# Patient Record
Sex: MALE | Race: ASIAN | NOT HISPANIC OR LATINO | Employment: OTHER
[De-identification: names, ages, dates, MRNs, and addresses within clinical notes are randomized per-mention and may not be internally consistent; named-entity substitution may affect disease eponyms.]

---

## 2017-10-17 ENCOUNTER — EXTERNAL RECORD (OUTPATIENT)
Dept: HEALTH INFORMATION MANAGEMENT | Facility: OTHER | Age: 67
End: 2017-10-17

## 2017-11-21 ENCOUNTER — TELEPHONE (OUTPATIENT)
Dept: NEUROLOGY | Facility: CLINIC | Age: 67
End: 2017-11-21

## 2017-11-21 ENCOUNTER — OFFICE VISIT (OUTPATIENT)
Dept: NEUROLOGY | Facility: CLINIC | Age: 67
End: 2017-11-21

## 2017-11-21 VITALS
RESPIRATION RATE: 20 BRPM | HEART RATE: 80 BPM | SYSTOLIC BLOOD PRESSURE: 144 MMHG | DIASTOLIC BLOOD PRESSURE: 80 MMHG | BODY MASS INDEX: 27 KG/M2 | WEIGHT: 161 LBS

## 2017-11-21 DIAGNOSIS — F32.9 REACTIVE DEPRESSION: ICD-10-CM

## 2017-11-21 DIAGNOSIS — R41.3 MEMORY LOSS, SHORT TERM: Primary | ICD-10-CM

## 2017-11-21 DIAGNOSIS — F10.21 ALCOHOL DEPENDENCE IN REMISSION (HCC): ICD-10-CM

## 2017-11-21 DIAGNOSIS — R46.89 AGGRESSIVENESS: ICD-10-CM

## 2017-11-21 DIAGNOSIS — F41.9 ANXIETY: ICD-10-CM

## 2017-11-21 PROCEDURE — 99205 OFFICE O/P NEW HI 60 MIN: CPT | Performed by: OTHER

## 2017-11-21 NOTE — PROGRESS NOTES
[facility] [address]27   Neurology; INITIAL CLINIC VISIT  2017, 9:41 AM     Sue Rankin Patient Status:  No patient class for patient encounter    6/15/1950 MRN [id number]   Location ED [facility], [facility] Hypertension in his brother. SOCIAL HISTORY:   reports that he has never smoked. He has never used smokeless tobacco. He reports that he does not drink alcohol or use drugs.     ALLERGIES:  No Known Allergies    MEDICATIONS:    Current Outpatient Prescri wt loss  ALLERGY/IMM.: denies food or seasonal allergies      PHYSICAL EXAMINATION:  VITAL SIGNS: /80 (BP Location: Right arm, Patient Position: Sitting, Cuff Size: adult)   Pulse 80   Resp 20   Wt 161 lb   BMI 26.79 kg/m²    Body mass index is 26.79 loss, short term - Primary    Relevant Orders    EEG    TONIE, DIRECT, REFLEX TO 9 ENAS    FOLIC ACID SERUM(FOLATE)    C-REACTIVE PROTEIN    COMP METABOLIC PANEL (14)    CBC WITH DIFFERENTIAL WITH PLATELET    SED RATE, WESTERGREN (AUTOMATED)    RHEUMATOID AR Andie  11/21/2017

## 2017-11-21 NOTE — PATIENT INSTRUCTIONS
Refill policies:    • Allow 2-3 business days for refills; controlled substances may take longer.   • Contact your pharmacy at least 5 days prior to running out of medication and have them send an electronic request or submit request through the [facility] have a procedure or additional testing performed.  FOR BE[facility]IORAL [facility]) will contact your insurance carrier to obtain pre-certification or prior authorization.     Unfortunately, DREW has seen an increase in denial of payment even though the p questions.   Location:  [facility]    [address]  (MOB 2) [address] [facility], [address] [place] [address]

## 2017-12-26 ENCOUNTER — TELEPHONE (OUTPATIENT)
Dept: NEUROLOGY | Facility: CLINIC | Age: 67
End: 2017-12-26

## 2017-12-26 NOTE — TELEPHONE ENCOUNTER
Per TE from 11/21/17, lab orders had been faxed to pts PCPs office at their request on that date. No subsequent results have been received. LMTCB on both home and cell numbers.   Upon return call, please see if pt had these labs drawn by PCP, and if so

## 2017-12-29 ENCOUNTER — TELEPHONE (OUTPATIENT)
Dept: NEUROLOGY | Facility: CLINIC | Age: 67
End: 2017-12-29

## 2017-12-29 NOTE — TELEPHONE ENCOUNTER
Pateints daughter, Moo Osorio, contacted the office. States she will have the lab results faxed to our office. Provided office number. Letter placed in shred bin and not sent to patient. Awaiting fax.

## 2018-01-04 NOTE — TELEPHONE ENCOUNTER
Test results from Dr. Tejeda Ours office reviewed and initialed by Dr. Jimi Issa. Results were WNL, except HgA1C, which was 8.8. Pt had previously been informed of results by Dr. Clementina Nunn, so no need to contact pt.  Results were provided for the following with sp

## 2018-02-10 ENCOUNTER — EXTERNAL RECORD (OUTPATIENT)
Dept: HEALTH INFORMATION MANAGEMENT | Facility: OTHER | Age: 68
End: 2018-02-10

## 2018-03-01 ENCOUNTER — TELEPHONE (OUTPATIENT)
Dept: NEUROLOGY | Facility: CLINIC | Age: 68
End: 2018-03-01

## 2018-03-06 NOTE — TELEPHONE ENCOUNTER
Test results from Dr. Nati Gomez office reviewed and initialed by Dr. Zoya Parker. Results were WNL, except for the following (see below).   Results were provided for the following with specimen collected on 2/10/18:     HgA1C (high at 8.8)  Sed Rate (high at 50)

## 2018-03-06 NOTE — TELEPHONE ENCOUNTER
Per Dr. Angelique Diaz, patient needs follow up to review results. Left message for pt to give us a call back regarding test results. Please help patient make appointment. Lab reports placed in nursing bin.

## 2018-03-07 NOTE — TELEPHONE ENCOUNTER
Copy of these results placed in nursing  scan folder in case pt comes in before results have been scanned.

## 2019-01-29 ENCOUNTER — TELEPHONE (OUTPATIENT)
Dept: NEUROLOGY | Facility: CLINIC | Age: 69
End: 2019-01-29

## 2020-02-28 ENCOUNTER — OFFICE VISIT (OUTPATIENT)
Dept: INTERNAL MEDICINE | Age: 70
End: 2020-02-28

## 2020-02-28 ENCOUNTER — LAB SERVICES (OUTPATIENT)
Dept: LAB | Age: 70
End: 2020-02-28

## 2020-02-28 VITALS
HEART RATE: 84 BPM | WEIGHT: 152 LBS | HEIGHT: 65 IN | SYSTOLIC BLOOD PRESSURE: 141 MMHG | DIASTOLIC BLOOD PRESSURE: 92 MMHG | BODY MASS INDEX: 25.33 KG/M2

## 2020-02-28 DIAGNOSIS — E78.5 HYPERLIPIDEMIA, UNSPECIFIED HYPERLIPIDEMIA TYPE: ICD-10-CM

## 2020-02-28 DIAGNOSIS — H91.93 BILATERAL HEARING LOSS, UNSPECIFIED HEARING LOSS TYPE: ICD-10-CM

## 2020-02-28 DIAGNOSIS — I10 ESSENTIAL HYPERTENSION: ICD-10-CM

## 2020-02-28 DIAGNOSIS — E11.9 TYPE 2 DIABETES MELLITUS WITHOUT COMPLICATION, WITHOUT LONG-TERM CURRENT USE OF INSULIN (CMD): ICD-10-CM

## 2020-02-28 DIAGNOSIS — E11.9 TYPE 2 DIABETES MELLITUS WITHOUT COMPLICATION, WITHOUT LONG-TERM CURRENT USE OF INSULIN (CMD): Primary | ICD-10-CM

## 2020-02-28 LAB
EST. AVERAGE GLUCOSE BLD GHB EST-MCNC: 294 MG/DL (ref 0–154)
HBA1C MFR BLD: 11.9 % (ref 4.2–6)

## 2020-02-28 PROCEDURE — 83036 HEMOGLOBIN GLYCOSYLATED A1C: CPT | Performed by: INTERNAL MEDICINE

## 2020-02-28 PROCEDURE — 36415 COLL VENOUS BLD VENIPUNCTURE: CPT | Performed by: INTERNAL MEDICINE

## 2020-02-28 PROCEDURE — 99204 OFFICE O/P NEW MOD 45 MIN: CPT | Performed by: INTERNAL MEDICINE

## 2020-02-28 RX ORDER — ATORVASTATIN CALCIUM 10 MG/1
10 TABLET, FILM COATED ORAL NIGHTLY
Qty: 90 TABLET | Refills: 1 | Status: SHIPPED | OUTPATIENT
Start: 2020-02-28 | End: 2020-08-31

## 2020-02-28 RX ORDER — GLIMEPIRIDE 2 MG/1
TABLET ORAL
COMMUNITY
Start: 2015-08-10 | End: 2020-03-05 | Stop reason: SDUPTHER

## 2020-02-28 RX ORDER — AMLODIPINE BESYLATE AND ATORVASTATIN CALCIUM 10; 40 MG/1; MG/1
1 TABLET, FILM COATED ORAL DAILY
COMMUNITY
End: 2020-02-28 | Stop reason: CLARIF

## 2020-02-28 RX ORDER — AMLODIPINE AND BENAZEPRIL HYDROCHLORIDE 10; 40 MG/1; MG/1
1 CAPSULE ORAL DAILY
Qty: 90 CAPSULE | Refills: 1 | Status: SHIPPED | OUTPATIENT
Start: 2020-02-28 | End: 2020-08-31

## 2020-02-28 RX ORDER — BLOOD-GLUCOSE METER
EACH MISCELLANEOUS
COMMUNITY
Start: 2014-08-18 | End: 2020-03-06 | Stop reason: ALTCHOICE

## 2020-02-28 RX ORDER — ATORVASTATIN CALCIUM 10 MG/1
TABLET, FILM COATED ORAL
COMMUNITY
Start: 2015-08-10 | End: 2020-02-28 | Stop reason: SDUPTHER

## 2020-02-28 RX ORDER — LANOLIN ALCOHOL/MO/W.PET/CERES
100 CREAM (GRAM) TOPICAL
COMMUNITY
Start: 2017-05-19

## 2020-02-28 ASSESSMENT — ENCOUNTER SYMPTOMS
SORE THROAT: 0
HEADACHES: 0
COUGH: 0
SEIZURES: 0
FEVER: 0
VOMITING: 0
TREMORS: 0
DIZZINESS: 0
LIGHT-HEADEDNESS: 0
ABDOMINAL PAIN: 0
BLOOD IN STOOL: 0
WEAKNESS: 0
ANAL BLEEDING: 0
CHILLS: 0
DIARRHEA: 0
EYE PAIN: 0
SHORTNESS OF BREATH: 0
NUMBNESS: 0
TROUBLE SWALLOWING: 0

## 2020-03-04 ENCOUNTER — TELEPHONE (OUTPATIENT)
Dept: INTERNAL MEDICINE | Age: 70
End: 2020-03-04

## 2020-03-04 DIAGNOSIS — E11.9 TYPE 2 DIABETES MELLITUS WITHOUT COMPLICATION, WITHOUT LONG-TERM CURRENT USE OF INSULIN (CMD): Primary | ICD-10-CM

## 2020-03-04 DIAGNOSIS — Z12.5 PROSTATE CANCER SCREENING: ICD-10-CM

## 2020-03-04 DIAGNOSIS — E78.5 HYPERLIPIDEMIA, UNSPECIFIED HYPERLIPIDEMIA TYPE: ICD-10-CM

## 2020-03-05 ENCOUNTER — WALK IN (OUTPATIENT)
Dept: URGENT CARE | Age: 70
End: 2020-03-05

## 2020-03-05 VITALS
SYSTOLIC BLOOD PRESSURE: 128 MMHG | OXYGEN SATURATION: 95 % | HEART RATE: 99 BPM | RESPIRATION RATE: 20 BRPM | DIASTOLIC BLOOD PRESSURE: 78 MMHG | TEMPERATURE: 100.9 F

## 2020-03-05 DIAGNOSIS — J40 BRONCHITIS: Primary | ICD-10-CM

## 2020-03-05 DIAGNOSIS — M54.32 SCIATICA OF LEFT SIDE: ICD-10-CM

## 2020-03-05 PROCEDURE — 99204 OFFICE O/P NEW MOD 45 MIN: CPT | Performed by: FAMILY MEDICINE

## 2020-03-05 RX ORDER — GLIMEPIRIDE 2 MG/1
2 TABLET ORAL DAILY
Qty: 90 TABLET | Refills: 1 | Status: SHIPPED | OUTPATIENT
Start: 2020-03-05 | End: 2020-09-09

## 2020-03-05 RX ORDER — AMOXICILLIN AND CLAVULANATE POTASSIUM 875; 125 MG/1; MG/1
1 TABLET, FILM COATED ORAL 2 TIMES DAILY
Qty: 20 TABLET | Refills: 0 | Status: SHIPPED | OUTPATIENT
Start: 2020-03-05 | End: 2020-03-15

## 2020-03-06 ENCOUNTER — NURSE ONLY (OUTPATIENT)
Dept: INTERNAL MEDICINE | Age: 70
End: 2020-03-06

## 2020-03-06 DIAGNOSIS — E11.69 TYPE 2 DIABETES MELLITUS WITH OTHER SPECIFIED COMPLICATION, UNSPECIFIED WHETHER LONG TERM INSULIN USE (CMD): Primary | ICD-10-CM

## 2020-03-06 PROCEDURE — X1094 NO CHARGE VISIT: HCPCS | Performed by: INTERNAL MEDICINE

## 2020-03-20 ENCOUNTER — TELEPHONE (OUTPATIENT)
Dept: OTOLARYNGOLOGY | Age: 70
End: 2020-03-20

## 2020-03-27 ENCOUNTER — APPOINTMENT (OUTPATIENT)
Dept: OTOLARYNGOLOGY | Age: 70
End: 2020-03-27
Attending: INTERNAL MEDICINE

## 2020-04-28 ENCOUNTER — TELEPHONE (OUTPATIENT)
Dept: OPTOMETRY | Age: 70
End: 2020-04-28

## 2020-05-05 ENCOUNTER — APPOINTMENT (OUTPATIENT)
Dept: OPTOMETRY | Age: 70
End: 2020-05-05
Attending: INTERNAL MEDICINE

## 2020-07-21 ENCOUNTER — TELEPHONE (OUTPATIENT)
Dept: CHRONIC DISEASE MANAGEMENT | Age: 70
End: 2020-07-21

## 2020-08-26 ENCOUNTER — TELEPHONE (OUTPATIENT)
Dept: FAMILY MEDICINE | Age: 70
End: 2020-08-26

## 2020-08-27 ENCOUNTER — APPOINTMENT (OUTPATIENT)
Dept: FAMILY MEDICINE | Age: 70
End: 2020-08-27

## 2020-08-29 ENCOUNTER — TELEPHONE (OUTPATIENT)
Dept: INTERNAL MEDICINE | Age: 70
End: 2020-08-29

## 2020-08-31 RX ORDER — AMLODIPINE AND BENAZEPRIL HYDROCHLORIDE 10; 40 MG/1; MG/1
CAPSULE ORAL
Qty: 90 CAPSULE | Refills: 0 | Status: SHIPPED | OUTPATIENT
Start: 2020-08-31 | End: 2020-10-07 | Stop reason: SDUPTHER

## 2020-08-31 RX ORDER — ATORVASTATIN CALCIUM 10 MG/1
10 TABLET, FILM COATED ORAL NIGHTLY
Qty: 90 TABLET | Refills: 0 | Status: SHIPPED | OUTPATIENT
Start: 2020-08-31 | End: 2020-10-07 | Stop reason: SDUPTHER

## 2020-09-09 RX ORDER — GLIMEPIRIDE 2 MG/1
TABLET ORAL
Qty: 30 TABLET | Refills: 0 | Status: SHIPPED | OUTPATIENT
Start: 2020-09-09 | End: 2020-10-06

## 2020-09-23 ENCOUNTER — OFFICE VISIT (OUTPATIENT)
Dept: INTERNAL MEDICINE | Age: 70
End: 2020-09-23

## 2020-09-23 VITALS
SYSTOLIC BLOOD PRESSURE: 152 MMHG | DIASTOLIC BLOOD PRESSURE: 103 MMHG | WEIGHT: 155 LBS | HEART RATE: 84 BPM | HEIGHT: 65 IN | BODY MASS INDEX: 25.83 KG/M2

## 2020-09-23 DIAGNOSIS — H91.90 HEARING LOSS, UNSPECIFIED HEARING LOSS TYPE, UNSPECIFIED LATERALITY: ICD-10-CM

## 2020-09-23 DIAGNOSIS — E11.9 TYPE 2 DIABETES MELLITUS WITHOUT COMPLICATION, WITHOUT LONG-TERM CURRENT USE OF INSULIN (CMD): ICD-10-CM

## 2020-09-23 DIAGNOSIS — I10 ESSENTIAL HYPERTENSION: Primary | ICD-10-CM

## 2020-09-23 DIAGNOSIS — E78.5 HYPERLIPIDEMIA, UNSPECIFIED HYPERLIPIDEMIA TYPE: ICD-10-CM

## 2020-09-23 PROCEDURE — 99214 OFFICE O/P EST MOD 30 MIN: CPT | Performed by: INTERNAL MEDICINE

## 2020-09-23 RX ORDER — GLIMEPIRIDE 2 MG/1
2 TABLET ORAL DAILY
Qty: 90 TABLET | Refills: 1 | Status: CANCELLED | OUTPATIENT
Start: 2020-09-23

## 2020-09-23 RX ORDER — AMLODIPINE AND BENAZEPRIL HYDROCHLORIDE 10; 40 MG/1; MG/1
CAPSULE ORAL
Qty: 90 CAPSULE | Refills: 1 | Status: CANCELLED | OUTPATIENT
Start: 2020-09-23

## 2020-09-23 RX ORDER — ATORVASTATIN CALCIUM 10 MG/1
10 TABLET, FILM COATED ORAL NIGHTLY
Qty: 90 TABLET | Refills: 1 | Status: CANCELLED | OUTPATIENT
Start: 2020-09-23

## 2020-09-23 ASSESSMENT — ENCOUNTER SYMPTOMS
NUMBNESS: 0
DIZZINESS: 0
WHEEZING: 0
SHORTNESS OF BREATH: 0
BLOOD IN STOOL: 0
VOMITING: 0
SEIZURES: 0
CHILLS: 0
ABDOMINAL PAIN: 0
TROUBLE SWALLOWING: 0
DIARRHEA: 0
LIGHT-HEADEDNESS: 0
HEADACHES: 0
EYE PAIN: 0
SORE THROAT: 0
ANAL BLEEDING: 0
COUGH: 0
FEVER: 0

## 2020-09-26 ENCOUNTER — LAB SERVICES (OUTPATIENT)
Dept: LAB | Age: 70
End: 2020-09-26

## 2020-09-26 DIAGNOSIS — Z12.5 PROSTATE CANCER SCREENING: ICD-10-CM

## 2020-09-26 DIAGNOSIS — E78.5 HYPERLIPIDEMIA, UNSPECIFIED HYPERLIPIDEMIA TYPE: ICD-10-CM

## 2020-09-26 DIAGNOSIS — E11.9 TYPE 2 DIABETES MELLITUS WITHOUT COMPLICATION, WITHOUT LONG-TERM CURRENT USE OF INSULIN (CMD): ICD-10-CM

## 2020-09-26 LAB
ALBUMIN SERPL-MCNC: 4.4 G/DL (ref 3.6–5.1)
ALP SERPL-CCNC: 72 U/L (ref 45–115)
ALT SERPL W/O P-5'-P-CCNC: 19 U/L (ref 5–49)
AST SERPL-CCNC: 30 U/L (ref 14–43)
BASOPHIL %: 0.3 % (ref 0–1.2)
BASOPHIL ABSOLUTE #: 0 10*3/UL (ref 0–0.1)
BILIRUB SERPL-MCNC: 0.9 MG/DL (ref 0–1.3)
BUN SERPL-MCNC: 17 MG/DL (ref 6–27)
CALCIUM SERPL-MCNC: 9.7 MG/DL (ref 8.6–10.6)
CHLORIDE SERPL-SCNC: 101 MMOL/L (ref 96–107)
CHOLEST SERPL-MCNC: 146 MG/DL (ref 140–200)
CO2 SERPL-SCNC: 30 MMOL/L (ref 22–32)
CREAT SERPL-MCNC: 0.9 MG/DL (ref 0.6–1.6)
DIFFERENTIAL TYPE: ABNORMAL
EOSINOPHIL %: 3 % (ref 0–10)
EOSINOPHIL ABSOLUTE #: 0.3 10*3/UL (ref 0–0.5)
EST. AVERAGE GLUCOSE BLD GHB EST-MCNC: 167 MG/DL (ref 0–154)
GFR SERPL CREATININE-BSD FRML MDRD: >60 ML/MIN/{1.73M2}
GFR SERPL CREATININE-BSD FRML MDRD: >60 ML/MIN/{1.73M2}
GLUCOSE P FAST SERPL-MCNC: 152 MG/DL (ref 60–100)
HBA1C MFR BLD: 7.4 % (ref 4.2–6)
HDLC SERPL-MCNC: 90 MG/DL
HEMATOCRIT: 42.5 % (ref 40–51)
HEMOGLOBIN: 14.1 G/DL (ref 13.7–17.5)
IMMATURE GRANULOCYTE ABSOLUTE: 0.05 10*3/UL (ref 0–0.05)
IMMATURE GRANULOCYTE PERCENT: 0.5 % (ref 0–0.5)
LDLC SERPL CALC-MCNC: 34 MG/DL (ref 30–100)
LYMPH PERCENT: 27.5 % (ref 20.5–51.1)
LYMPHOCYTE ABSOLUTE #: 3 10*3/UL (ref 1.2–3.4)
MEAN CORPUSCULAR HGB CONCENTRATION: 33.2 % (ref 32–36)
MEAN CORPUSCULAR HGB: 31.5 PG (ref 27–34)
MEAN CORPUSCULAR VOLUME: 94.9 FL (ref 79–95)
MEAN PLATELET VOLUME: 9.7 FL (ref 8.6–12.4)
MONOCYTE ABSOLUTE #: 1.1 10*3/UL (ref 0.2–0.9)
MONOCYTE PERCENT: 9.9 % (ref 4.3–12.9)
NEUTROPHIL ABSOLUTE #: 6.3 10*3/UL (ref 1.4–6.5)
NEUTROPHIL PERCENT: 58.8 % (ref 34–73.5)
PLATELET COUNT: 318 10*3/UL (ref 150–400)
POTASSIUM SERPL-SCNC: 4.9 MMOL/L (ref 3.5–5.3)
PROT SERPL-MCNC: 8 G/DL (ref 6.4–8.5)
RED BLOOD CELL COUNT: 4.48 10*6/UL (ref 3.9–5.7)
RED CELL DISTRIBUTION WIDTH: 12.5 % (ref 11.3–14.8)
SODIUM SERPL-SCNC: 137 MMOL/L (ref 136–146)
TRIGL SERPL-MCNC: 111 MG/DL (ref 0–200)
WHITE BLOOD CELL COUNT: 10.7 10*3/UL (ref 4–10)

## 2020-09-26 PROCEDURE — 80053 COMPREHEN METABOLIC PANEL: CPT | Performed by: INTERNAL MEDICINE

## 2020-09-26 PROCEDURE — 85025 COMPLETE CBC W/AUTO DIFF WBC: CPT | Performed by: INTERNAL MEDICINE

## 2020-09-26 PROCEDURE — 36415 COLL VENOUS BLD VENIPUNCTURE: CPT | Performed by: INTERNAL MEDICINE

## 2020-09-26 PROCEDURE — 83036 HEMOGLOBIN GLYCOSYLATED A1C: CPT | Performed by: INTERNAL MEDICINE

## 2020-09-26 PROCEDURE — G0103 PSA SCREENING: HCPCS | Performed by: INTERNAL MEDICINE

## 2020-09-26 PROCEDURE — 80061 LIPID PANEL: CPT | Performed by: INTERNAL MEDICINE

## 2020-09-28 ENCOUNTER — TELEPHONE (OUTPATIENT)
Dept: SCHEDULING | Age: 70
End: 2020-09-28

## 2020-10-02 LAB — PSA SERPL-MCNC: 1.56 NG/ML (ref 0–4.9)

## 2020-10-03 ENCOUNTER — TELEPHONIC VISIT (OUTPATIENT)
Dept: NUTRITION | Age: 70
End: 2020-10-03
Attending: INTERNAL MEDICINE

## 2020-10-03 DIAGNOSIS — E11.9 TYPE 2 DIABETES MELLITUS WITHOUT COMPLICATION, WITHOUT LONG-TERM CURRENT USE OF INSULIN (CMD): Primary | ICD-10-CM

## 2020-10-03 PROCEDURE — 97802 MEDICAL NUTRITION INDIV IN: CPT | Performed by: DIETITIAN, REGISTERED

## 2020-10-06 RX ORDER — GLIMEPIRIDE 2 MG/1
TABLET ORAL
Qty: 90 TABLET | Refills: 3 | Status: SHIPPED | OUTPATIENT
Start: 2020-10-06

## 2020-10-07 RX ORDER — AMLODIPINE AND BENAZEPRIL HYDROCHLORIDE 10; 40 MG/1; MG/1
1 CAPSULE ORAL DAILY
Qty: 90 CAPSULE | Refills: 1 | Status: SHIPPED | OUTPATIENT
Start: 2020-10-07 | End: 2021-04-27

## 2020-10-07 RX ORDER — ATORVASTATIN CALCIUM 10 MG/1
10 TABLET, FILM COATED ORAL NIGHTLY
Qty: 90 TABLET | Refills: 1 | Status: SHIPPED | OUTPATIENT
Start: 2020-10-07 | End: 2021-04-27

## 2020-10-13 ENCOUNTER — OFFICE VISIT (OUTPATIENT)
Dept: OPHTHALMOLOGY | Age: 70
End: 2020-10-13
Attending: INTERNAL MEDICINE

## 2020-10-13 DIAGNOSIS — H25.013 CORTICAL SENILE CATARACT, BILATERAL: ICD-10-CM

## 2020-10-13 DIAGNOSIS — H52.03 HYPEROPIA, BILATERAL: ICD-10-CM

## 2020-10-13 DIAGNOSIS — E11.9 TYPE 2 DIABETES MELLITUS WITHOUT RETINOPATHY (CMD): Primary | ICD-10-CM

## 2020-10-13 PROCEDURE — 92015 DETERMINE REFRACTIVE STATE: CPT | Performed by: OPHTHALMOLOGY

## 2020-10-13 PROCEDURE — 92004 COMPRE OPH EXAM NEW PT 1/>: CPT | Performed by: OPHTHALMOLOGY

## 2020-10-13 ASSESSMENT — REFRACTION_MANIFEST
OS_SPHERE: +1.50
OD_CYLINDER: +1.25
OD_AXIS: 180
OS_CYLINDER: +0.50
OS_ADD: +2.75
OD_SPHERE: +1.00
OS_AXIS: 030
OD_ADD: +2.75

## 2020-10-26 ENCOUNTER — OFFICE VISIT (OUTPATIENT)
Dept: AUDIOLOGY | Age: 70
End: 2020-10-26
Attending: INTERNAL MEDICINE

## 2020-10-26 DIAGNOSIS — H90.3 SENSORINEURAL HEARING LOSS (SNHL) OF BOTH EARS: Primary | ICD-10-CM

## 2020-10-26 PROCEDURE — 92557 COMPREHENSIVE HEARING TEST: CPT | Performed by: AUDIOLOGIST

## 2020-11-04 ENCOUNTER — NURSE ONLY (OUTPATIENT)
Dept: INTERNAL MEDICINE | Age: 70
End: 2020-11-04

## 2020-11-04 DIAGNOSIS — Z23 NEED FOR VACCINATION: ICD-10-CM

## 2020-11-04 PROCEDURE — G0008 ADMIN INFLUENZA VIRUS VAC: HCPCS

## 2020-11-04 PROCEDURE — 90686 IIV4 VACC NO PRSV 0.5 ML IM: CPT

## 2021-01-04 ENCOUNTER — TELEPHONE (OUTPATIENT)
Dept: AUDIOLOGY | Age: 71
End: 2021-01-04

## 2021-01-11 ENCOUNTER — OFFICE VISIT (OUTPATIENT)
Dept: FAMILY MEDICINE CLINIC | Facility: CLINIC | Age: 71
End: 2021-01-11
Payer: MEDICARE

## 2021-01-11 ENCOUNTER — APPOINTMENT (OUTPATIENT)
Dept: AUDIOLOGY | Age: 71
End: 2021-01-11

## 2021-01-11 ENCOUNTER — TELEPHONE (OUTPATIENT)
Dept: AUDIOLOGY | Age: 71
End: 2021-01-11

## 2021-01-11 VITALS
WEIGHT: 156.38 LBS | TEMPERATURE: 98 F | SYSTOLIC BLOOD PRESSURE: 142 MMHG | OXYGEN SATURATION: 96 % | HEIGHT: 65 IN | DIASTOLIC BLOOD PRESSURE: 80 MMHG | HEART RATE: 80 BPM | RESPIRATION RATE: 18 BRPM | BODY MASS INDEX: 26.05 KG/M2

## 2021-01-11 DIAGNOSIS — E78.5 HYPERLIPIDEMIA, UNSPECIFIED HYPERLIPIDEMIA TYPE: ICD-10-CM

## 2021-01-11 DIAGNOSIS — Z23 NEED FOR VACCINATION: ICD-10-CM

## 2021-01-11 DIAGNOSIS — E66.3 OVERWEIGHT (BMI 25.0-29.9): ICD-10-CM

## 2021-01-11 DIAGNOSIS — R41.3 MEMORY DEFICIT: ICD-10-CM

## 2021-01-11 DIAGNOSIS — H91.93 BILATERAL HEARING LOSS, UNSPECIFIED HEARING LOSS TYPE: ICD-10-CM

## 2021-01-11 DIAGNOSIS — F10.29 ALCOHOL DEPENDENCE WITH UNSPECIFIED ALCOHOL-INDUCED DISORDER (HCC): ICD-10-CM

## 2021-01-11 DIAGNOSIS — Z12.11 COLON CANCER SCREENING: ICD-10-CM

## 2021-01-11 DIAGNOSIS — R45.4 DIFFICULTY CONTROLLING ANGER: ICD-10-CM

## 2021-01-11 DIAGNOSIS — E11.65 UNCONTROLLED TYPE 2 DIABETES MELLITUS WITH HYPERGLYCEMIA (HCC): ICD-10-CM

## 2021-01-11 DIAGNOSIS — I10 BENIGN ESSENTIAL HYPERTENSION: ICD-10-CM

## 2021-01-11 DIAGNOSIS — Z00.00 ENCOUNTER FOR ROUTINE ADULT HEALTH EXAMINATION WITHOUT ABNORMAL FINDINGS: Primary | ICD-10-CM

## 2021-01-11 PROBLEM — E53.8 VITAMIN B12 DEFICIENCY (NON ANEMIC): Status: ACTIVE | Noted: 2021-01-11

## 2021-01-11 PROBLEM — F32.9 MAJOR DEPRESSION, SINGLE EPISODE: Status: ACTIVE | Noted: 2021-01-11

## 2021-01-11 PROBLEM — E11.9 TYPE 2 DIABETES MELLITUS WITHOUT COMPLICATION (HCC): Status: ACTIVE | Noted: 2020-02-28

## 2021-01-11 PROBLEM — E55.9 VITAMIN D DEFICIENCY: Status: ACTIVE | Noted: 2021-01-11

## 2021-01-11 PROBLEM — F32.9 REACTIVE DEPRESSION: Status: RESOLVED | Noted: 2017-11-21 | Resolved: 2021-01-11

## 2021-01-11 PROCEDURE — 99397 PER PM REEVAL EST PAT 65+ YR: CPT | Performed by: FAMILY MEDICINE

## 2021-01-11 PROCEDURE — 3077F SYST BP >= 140 MM HG: CPT | Performed by: FAMILY MEDICINE

## 2021-01-11 PROCEDURE — 3079F DIAST BP 80-89 MM HG: CPT | Performed by: FAMILY MEDICINE

## 2021-01-11 PROCEDURE — 3008F BODY MASS INDEX DOCD: CPT | Performed by: FAMILY MEDICINE

## 2021-01-11 PROCEDURE — 96160 PT-FOCUSED HLTH RISK ASSMT: CPT | Performed by: FAMILY MEDICINE

## 2021-01-11 PROCEDURE — G0438 PPPS, INITIAL VISIT: HCPCS | Performed by: FAMILY MEDICINE

## 2021-01-11 RX ORDER — PNEUMOCOCCAL VACCINE POLYVALENT 25; 25; 25; 25; 25; 25; 25; 25; 25; 25; 25; 25; 25; 25; 25; 25; 25; 25; 25; 25; 25; 25; 25 UG/.5ML; UG/.5ML; UG/.5ML; UG/.5ML; UG/.5ML; UG/.5ML; UG/.5ML; UG/.5ML; UG/.5ML; UG/.5ML; UG/.5ML; UG/.5ML; UG/.5ML; UG/.5ML; UG/.5ML; UG/.5ML; UG/.5ML; UG/.5ML; UG/.5ML; UG/.5ML; UG/.5ML; UG/.5ML; UG/.5ML
0.5 INJECTION, SOLUTION INTRAMUSCULAR; SUBCUTANEOUS
Qty: 1 VIAL | Refills: 0 | Status: CANCELLED | OUTPATIENT
Start: 2021-01-11

## 2021-01-11 RX ORDER — ATORVASTATIN CALCIUM 10 MG/1
10 TABLET, FILM COATED ORAL NIGHTLY
COMMUNITY
Start: 2020-10-07

## 2021-01-11 NOTE — PROGRESS NOTES
REASON FOR VISIT:    Cornel Kline is a 78year old male who presents for a MA Supervisit. Slade Sawant is a 77 yo M with PMH f DM2, HTN, HL here to establish care p/w MA Supervisit. Annual physical:  Overall, pt states he feels well.  He is a retired ma phone. With his previous PCP, he was evaluated by ENT and recommended to get hearing aids.      Patient Care Team: Patient Care Team:  Lanny Villareal MD as PCP - General (Family Medicine)  Judith Brooks MD as Psychiatrist/APN (Psychiatry)  Xavier Mo Cholesterol Latest Ref Rng & Units 10/4/2014 7/24/2014   Total Cholesterol 100 - 199 mg/dL 145 213(H)   Triglycerides 0 - 149 mg/dL 119 131   HDL >39 mg/dL 73 75   LDL 0 - 99 mg/dL 48 112(H)     BUN and Cr Latest Ref Rng & Units 10/4/2014 7/24/2014 1/1 of the week is this?: Correct  What month is it?: Correct  What year is it?: Correct  Recall \"Ball\": Correct  Recall \"Flag\":  Incorrect  Recall \"Tree\": Correct         PREVENTATIVE SERVICES   INDICATIONS AND SCHEDULE Internal Lab or Procedure   Diabet History      Immunization History  Administered            Date(s) Administered    >=3 YRS TRI  MULTIDOSE VIAL (60040) FLU CLINIC                          02/12/2014 09/17/2014      FLUZONE 6 months and older PFS 0.5 ml (20033)                          11 are clear                Hearing Assessed via: Questionnaire  EYES: PERRLA, EOMI, conjunctiva are clear  CHEST: no chest tenderness  LUNGS: clear to auscultation  CARDIO: RRR without murmur  GI: good BS's, no masses, HSM or tenderness  : two descended te [facility]  - cont Atorvastatin daily  - d/w pt a healthy, low-fat/low-cholesterol diet, regular exercise, and wt loss  - RTC in 3 months for f-u    -     COMP METABOLIC PANEL (14)  -     LIPID PANEL    Benign essential hypertension  - BP elevated today (did no visit  Alcohol cessation couseling performed  Diet counseling perfomed  Exercise counseling perfomed    SUGGESTED VACCINATIONS - Influenza, Pneumococcal, Zoster, Tetanus   Influenza: There are no preventive care reminders to display for this patient.   Pneu

## 2021-01-18 PROCEDURE — 3061F NEG MICROALBUMINURIA REV: CPT | Performed by: FAMILY MEDICINE

## 2021-01-18 PROCEDURE — 3052F HG A1C>EQUAL 8.0%<EQUAL 9.0%: CPT | Performed by: FAMILY MEDICINE

## 2021-01-19 ENCOUNTER — TELEPHONE (OUTPATIENT)
Dept: FAMILY MEDICINE CLINIC | Facility: CLINIC | Age: 71
End: 2021-01-19

## 2021-01-19 DIAGNOSIS — E11.65 UNCONTROLLED TYPE 2 DIABETES MELLITUS WITH HYPERGLYCEMIA (HCC): Primary | ICD-10-CM

## 2021-01-19 LAB
ABSOLUTE BASOPHILS: 30 CELLS/UL (ref 0–200)
ABSOLUTE EOSINOPHILS: 289 CELLS/UL (ref 15–500)
ABSOLUTE LYMPHOCYTES: 2500 CELLS/UL (ref 850–3900)
ABSOLUTE MONOCYTES: 707 CELLS/UL (ref 200–950)
ABSOLUTE NEUTROPHILS: 4074 CELLS/UL (ref 1500–7800)
ALBUMIN/GLOBULIN RATIO: 1.2 (CALC) (ref 1–2.5)
ALBUMIN: 4.3 G/DL (ref 3.6–5.1)
ALKALINE PHOSPHATASE: 70 U/L (ref 35–144)
ALT: 15 U/L (ref 9–46)
AST: 17 U/L (ref 10–35)
BASOPHILS: 0.4 %
BILIRUBIN, TOTAL: 0.9 MG/DL (ref 0.2–1.2)
BUN: 19 MG/DL (ref 7–25)
CALCIUM: 9.8 MG/DL (ref 8.6–10.3)
CARBON DIOXIDE: 28 MMOL/L (ref 20–32)
CHLORIDE: 99 MMOL/L (ref 98–110)
CHOL/HDLC RATIO: 2.6 (CALC)
CHOLESTEROL, TOTAL: 159 MG/DL
CREATININE, RANDOM URINE: 140 MG/DL (ref 20–320)
CREATININE: 1.08 MG/DL (ref 0.7–1.25)
EGFR IF AFRICN AM: 81 ML/MIN/1.73M2
EGFR IF NONAFRICN AM: 70 ML/MIN/1.73M2
EOSINOPHILS: 3.8 %
GLOBULIN: 3.5 G/DL (CALC) (ref 1.9–3.7)
GLUCOSE: 200 MG/DL (ref 65–99)
HDL CHOLESTEROL: 61 MG/DL
HEMATOCRIT: 42.8 % (ref 38.5–50)
HEMOGLOBIN A1C: 8 % OF TOTAL HGB
HEMOGLOBIN: 14.6 G/DL (ref 13.2–17.1)
LDL-CHOLESTEROL: 74 MG/DL (CALC)
LYMPHOCYTES: 32.9 %
MCH: 31.7 PG (ref 27–33)
MCHC: 34.1 G/DL (ref 32–36)
MCV: 93 FL (ref 80–100)
MICROALBUMIN/CREATININE RATIO, RANDOM URINE: 23 MCG/MG CREAT
MICROALBUMIN: 3.2 MG/DL
MONOCYTES: 9.3 %
MPV: 9.9 FL (ref 7.5–12.5)
NEUTROPHILS: 53.6 %
NON-HDL CHOLESTEROL: 98 MG/DL (CALC)
PLATELET COUNT: 360 THOUSAND/UL (ref 140–400)
POTASSIUM: 4.4 MMOL/L (ref 3.5–5.3)
PROTEIN, TOTAL: 7.8 G/DL (ref 6.1–8.1)
RDW: 11.8 % (ref 11–15)
RED BLOOD CELL COUNT: 4.6 MILLION/UL (ref 4.2–5.8)
SODIUM: 136 MMOL/L (ref 135–146)
TRIGLYCERIDES: 153 MG/DL
TSH W/REFLEX TO FT4: 0.88 MIU/L (ref 0.4–4.5)
VITAMIN B12: 574 PG/ML (ref 200–1100)
WHITE BLOOD CELL COUNT: 7.6 THOUSAND/UL (ref 3.8–10.8)

## 2021-01-19 RX ORDER — GLIMEPIRIDE 4 MG/1
4 TABLET ORAL
Qty: 90 TABLET | Refills: 0 | COMMUNITY
Start: 2021-01-19 | End: 2022-01-03

## 2021-01-19 NOTE — PROGRESS NOTES
Please call pt to inform of results (ok to speak to wife):  - diabetes A1c is up to 8.0 (goal is 7.0). Let's increase his Glimepiride to 4mg daily with breakfast. Keep Metformin the same at 100 mg BIDWM.   - cholesterol is showing just av eyr slightly elev

## 2021-01-19 NOTE — TELEPHONE ENCOUNTER
----- Message from Norberto White MD sent at 1/19/2021 12:24 PM CST -----  Please call pt to inform of results (ok to speak to wife):  - diabetes A1c is up to 8.0 (goal is 7.0).   Let's increase his Glimepiride to 4mg daily with breakfast. Keep Met

## 2021-01-20 NOTE — TELEPHONE ENCOUNTER
Additional results, per Tiara Ghosh MD   Please millie pt (or wife) and inform that his FIT test is normal.  Rpt in 1 year.

## 2021-01-20 NOTE — TELEPHONE ENCOUNTER
Spoke to patient's wife on HIPPA release. Notified of results. Will increase Glimepiride, follow-up labs and appt in mid-April.

## 2021-02-04 ENCOUNTER — TELEPHONE (OUTPATIENT)
Dept: INTERNAL MEDICINE | Age: 71
End: 2021-02-04

## 2021-02-08 ENCOUNTER — APPOINTMENT (OUTPATIENT)
Dept: INTERNAL MEDICINE | Age: 71
End: 2021-02-08

## 2021-03-04 DIAGNOSIS — Z23 NEED FOR VACCINATION: ICD-10-CM

## 2021-03-12 ENCOUNTER — PATIENT OUTREACH (OUTPATIENT)
Dept: FAMILY MEDICINE CLINIC | Facility: CLINIC | Age: 71
End: 2021-03-12

## 2021-03-12 NOTE — PROGRESS NOTES
Patient notified, due for HTN follow-up and repeat a1c 4/11/21. Chantelle Vásquez MD going on maternity leave end of April, approx. Patient states will call back if able to schedule. May be traveling to [place].

## 2021-04-27 RX ORDER — ATORVASTATIN CALCIUM 10 MG/1
TABLET, FILM COATED ORAL
Qty: 90 TABLET | Refills: 1 | Status: SHIPPED | OUTPATIENT
Start: 2021-04-27

## 2021-04-27 RX ORDER — AMLODIPINE AND BENAZEPRIL HYDROCHLORIDE 10; 40 MG/1; MG/1
CAPSULE ORAL
Qty: 90 CAPSULE | Refills: 0 | Status: SHIPPED | OUTPATIENT
Start: 2021-04-27

## 2021-05-10 ENCOUNTER — TELEPHONE (OUTPATIENT)
Dept: INTERNAL MEDICINE | Age: 71
End: 2021-05-10

## 2021-05-17 ENCOUNTER — OFFICE VISIT (OUTPATIENT)
Dept: FAMILY MEDICINE CLINIC | Facility: CLINIC | Age: 71
End: 2021-05-17
Payer: MEDICARE

## 2021-05-17 VITALS
RESPIRATION RATE: 18 BRPM | WEIGHT: 160.19 LBS | BODY MASS INDEX: 27 KG/M2 | SYSTOLIC BLOOD PRESSURE: 144 MMHG | OXYGEN SATURATION: 97 % | HEART RATE: 88 BPM | DIASTOLIC BLOOD PRESSURE: 82 MMHG | TEMPERATURE: 98 F

## 2021-05-17 DIAGNOSIS — K59.00 CONSTIPATION, UNSPECIFIED CONSTIPATION TYPE: ICD-10-CM

## 2021-05-17 DIAGNOSIS — M54.50 ACUTE MIDLINE LOW BACK PAIN WITHOUT SCIATICA: Primary | ICD-10-CM

## 2021-05-17 PROCEDURE — 3079F DIAST BP 80-89 MM HG: CPT | Performed by: NURSE PRACTITIONER

## 2021-05-17 PROCEDURE — 3077F SYST BP >= 140 MM HG: CPT | Performed by: NURSE PRACTITIONER

## 2021-05-17 PROCEDURE — 99213 OFFICE O/P EST LOW 20 MIN: CPT | Performed by: NURSE PRACTITIONER

## 2021-05-17 RX ORDER — METHYLPREDNISOLONE 4 MG/1
TABLET ORAL
Qty: 1 KIT | Refills: 0 | Status: SHIPPED | OUTPATIENT
Start: 2021-05-17 | End: 2021-10-20 | Stop reason: ALTCHOICE

## 2021-05-17 NOTE — PATIENT INSTRUCTIONS
Increase water intake, try miralax for constipation    Orders placed for xray of low back. Take oral steriod as prescribed. Will follow up once xrays come back. Wear good supportive shoes. ER precautions for worsening symptoms.

## 2021-05-17 NOTE — PROGRESS NOTES
HPI/Subjective:   Patient ID: Chapin Durand is a 78year old male. Patient presents to the clinic today accompanied by his wife for evaluation of low back pain. Reports symptoms began about 3 or 4 days ago.   Was first noticed when he was trying to ge Tablet Therapy Pack As directed. 1 kit 0   • Fluticasone Propionate (FLONASE) 50 MCG/ACT Nasal Suspension 2 sprays by Each Nare route daily.  1 Bottle 2   • glimepiride 4 MG Oral Tab Take 1 tablet (4 mg total) by mouth daily with breakfast. 90 tablet 0   • constipation type    No orders of the defined types were placed in this encounter. Patient was seen in office today for evaluation of low back pain. Plan is for patient to do 1 round of oral steroid and obtain lumbar x-ray.   Discussed possibility of re

## 2021-05-19 ENCOUNTER — HOSPITAL ENCOUNTER (OUTPATIENT)
Dept: GENERAL RADIOLOGY | Age: 71
Discharge: HOME | End: 2021-05-19
Attending: NURSE PRACTITIONER
Payer: MEDICARE

## 2021-05-19 DIAGNOSIS — M54.50 ACUTE MIDLINE LOW BACK PAIN WITHOUT SCIATICA: ICD-10-CM

## 2021-05-19 PROCEDURE — 72110 X-RAY EXAM L-2 SPINE 4/>VWS: CPT | Performed by: NURSE PRACTITIONER

## 2021-06-03 ENCOUNTER — OFFICE VISIT (OUTPATIENT)
Dept: SURGERY | Facility: CLINIC | Age: 71
End: 2021-06-03
Payer: MEDICARE

## 2021-06-03 VITALS
DIASTOLIC BLOOD PRESSURE: 88 MMHG | HEIGHT: 65 IN | BODY MASS INDEX: 26.66 KG/M2 | HEART RATE: 78 BPM | WEIGHT: 160 LBS | SYSTOLIC BLOOD PRESSURE: 136 MMHG

## 2021-06-03 DIAGNOSIS — M54.50 LUMBAR PAIN: ICD-10-CM

## 2021-06-03 DIAGNOSIS — M43.16 SPONDYLOLISTHESIS OF LUMBAR REGION: ICD-10-CM

## 2021-06-03 DIAGNOSIS — S32.010A COMPRESSION FRACTURE OF L1 VERTEBRA, INITIAL ENCOUNTER (HCC): Primary | ICD-10-CM

## 2021-06-03 DIAGNOSIS — S32.040A COMPRESSION FRACTURE OF L4 VERTEBRA, INITIAL ENCOUNTER (HCC): ICD-10-CM

## 2021-06-03 DIAGNOSIS — R41.3 POOR MEMORY: ICD-10-CM

## 2021-06-03 PROCEDURE — 3008F BODY MASS INDEX DOCD: CPT | Performed by: PHYSICIAN ASSISTANT

## 2021-06-03 PROCEDURE — 3079F DIAST BP 80-89 MM HG: CPT | Performed by: PHYSICIAN ASSISTANT

## 2021-06-03 PROCEDURE — 3075F SYST BP GE 130 - 139MM HG: CPT | Performed by: PHYSICIAN ASSISTANT

## 2021-06-03 PROCEDURE — 99203 OFFICE O/P NEW LOW 30 MIN: CPT | Performed by: PHYSICIAN ASSISTANT

## 2021-06-03 RX ORDER — HYDROCHLOROTHIAZIDE 25 MG/1
TABLET ORAL
COMMUNITY

## 2021-06-03 RX ORDER — AMLODIPINE BESYLATE AND BENAZEPRIL HYDROCHLORIDE 10; 40 MG/1; MG/1
CAPSULE ORAL
COMMUNITY
End: 2021-11-26

## 2021-06-03 NOTE — PROGRESS NOTES
Spenco Orthotic Arch Supports                               SPENCO Orthotic Arch Supports (AKA Spenco Orthotic Insoles) are ¾ length nylon (plastic arch supports that are covered with [place] classic green neoprene cushion material. SPENCO Orthotic Arch Supports are designed to support the medial and lateral arch. A SPENCO Orthotic Arch Support is ideal for people that need corrective support, but have tried other higher or harder orthotics and found them to be uncomfortable to wear. The nylon support of the SPENCO Orthotic Arch support is softer and more flexible than plastics used in other, harder orthotics and arch supports. This means that people with naturally lower arches or people with extremely flat feet with find that these arch supports are more comfortable to get used to than other higher arch supports.  May be obtained at:     [employer], [employer] or online      Keep feet clean and dry.  Vinegar soaks weekly (1 parts vinegar/2 part warm water).   Avoid cotton socks.  Powders to shoes, antiperspirants to feet daily.  Lysol spray to shoes twice weekly. Vicks twice a week to toenails. Continue Penlac.    Soaking instructions: Obtain epsom salts, soaking container, warm water. Neopsorin CREAM, FUNGAL CREAM and one inch band-aids.  Prior to bedtime:    1. Soak with the bandage on for ten minutes. Take bandage off and soak another ten minutes.  2. Pat dry and apply a thin coat of NEOSPORIN CREAM WITH FUNGAL CREAM and band-aid.    Proceed to do this every day and every night. DO NOT allow to dry out. Do this twice daily for one week.    WEEK 2     1. Soak for 15 min. At night and DO NOT apply cream. Allow to air dry by placing only a clean white sock over the foot.  2. Do NOT soak in the morning. Apply the NEOSPORIN CREAM AND FUNGAL CREAM and band-aid during the day.  3. Do this until drainage completely resolves.              Np here for acute midline back pain     Pt states he did not have any symptoms until taking his second COVID vaccine and woke in the middle of night in the middle of his back    Pt states he has a hard time and pain in the morning when getting up 0-1/10 bu

## 2021-06-03 NOTE — H&P
Patient: Jessica Pinto  Medical Record Number: [id number]  YOB: 1950  PCP: Rachael Canales MD    Referring Physician: Lj Savage  Reason for visit: Low back pain    Dear Dr. Lj Savage:   Thank you very much for requesting t Marital status:       Spouse name: Not on file      Number of children: 3      Years of education: Not on file      Highest education level: Not on file    Occupational History      Occupation:     Tobacco Use      Smoking status: Never S patient is in no apparent distress. Sitting comfortably in the examination chair. HEENT: Normocephalic, atraumatic. RESPIRATORY RATE: Easy and Even  SKIN: Warm and dry  NEURO: Awake, alert and orientated.  Speech fluent, comprehension intact, answering q (CST): Ritu Kwong MD on 5/19/2021 at 11:17 AM       Finalized by (ELYSE): Ritu Kwong MD on 5/19/2021 at 11:18 AM        Study Result    Narrative   MRI OF THE LUMBOSACRAL SPINE, Without Contrast, 8/12/2014.      Currently Available Comparison Moderate-severe central canal stenosis, partially congenital in etiology, exacerbated by   facet-ligamentous hypertrophy and minimal degenerative retrolisthesis, accompanied by borderline   left-sided foraminal stenosis.      L4-5:   Minimal concentric di offered. Patient would like to hold his he has no current back pain at this time. Advised if any development of back pain or lower extremity signs/symptoms, to please call and will order MRI lumbar spine to further assess.     3. Activity:    - TLSO offer

## 2021-06-23 ENCOUNTER — TELEPHONE (OUTPATIENT)
Dept: INTERNAL MEDICINE | Age: 71
End: 2021-06-23

## 2021-07-03 NOTE — TELEPHONE ENCOUNTER
Spoke to representative at Dr. Aleksandr Guadarrama office. She states no record of patient on file. Left message on patients wife's voicemail to contact the office. Sent patient letter to address on file. large/extra large/soft

## 2021-10-20 ENCOUNTER — OFFICE VISIT (OUTPATIENT)
Dept: FAMILY MEDICINE CLINIC | Facility: CLINIC | Age: 71
End: 2021-10-20
Payer: MEDICARE

## 2021-10-20 VITALS
BODY MASS INDEX: 25.39 KG/M2 | TEMPERATURE: 98 F | RESPIRATION RATE: 18 BRPM | HEART RATE: 94 BPM | HEIGHT: 65 IN | OXYGEN SATURATION: 95 % | DIASTOLIC BLOOD PRESSURE: 76 MMHG | SYSTOLIC BLOOD PRESSURE: 124 MMHG | WEIGHT: 152.38 LBS

## 2021-10-20 DIAGNOSIS — E78.5 HYPERLIPIDEMIA, UNSPECIFIED HYPERLIPIDEMIA TYPE: ICD-10-CM

## 2021-10-20 DIAGNOSIS — E11.65 UNCONTROLLED TYPE 2 DIABETES MELLITUS WITH HYPERGLYCEMIA (HCC): ICD-10-CM

## 2021-10-20 DIAGNOSIS — K59.00 CONSTIPATION, UNSPECIFIED CONSTIPATION TYPE: Primary | ICD-10-CM

## 2021-10-20 DIAGNOSIS — F10.29 ALCOHOL DEPENDENCE WITH UNSPECIFIED ALCOHOL-INDUCED DISORDER (HCC): ICD-10-CM

## 2021-10-20 DIAGNOSIS — I10 BENIGN ESSENTIAL HYPERTENSION: ICD-10-CM

## 2021-10-20 DIAGNOSIS — Z23 NEED FOR VACCINATION: ICD-10-CM

## 2021-10-20 DIAGNOSIS — R41.3 MEMORY DEFICIT: ICD-10-CM

## 2021-10-20 PROCEDURE — 3078F DIAST BP <80 MM HG: CPT | Performed by: FAMILY MEDICINE

## 2021-10-20 PROCEDURE — 99214 OFFICE O/P EST MOD 30 MIN: CPT | Performed by: FAMILY MEDICINE

## 2021-10-20 PROCEDURE — 90662 IIV NO PRSV INCREASED AG IM: CPT | Performed by: FAMILY MEDICINE

## 2021-10-20 PROCEDURE — G0008 ADMIN INFLUENZA VIRUS VAC: HCPCS | Performed by: FAMILY MEDICINE

## 2021-10-20 PROCEDURE — 3008F BODY MASS INDEX DOCD: CPT | Performed by: FAMILY MEDICINE

## 2021-10-20 PROCEDURE — 3074F SYST BP LT 130 MM HG: CPT | Performed by: FAMILY MEDICINE

## 2021-10-22 NOTE — PATIENT INSTRUCTIONS
Dementia and Caregiver Support   Dementia is a long-term (chronic) condition that affects the brain It causes loss of memory and thinking functions. Some forms of dementia are from degeneration of the brain. They get worse with time.  Other forms stay the a loss of long-term memory, don’t ask questions about past events. Instead, talk about what is happening now. Behavioral tips  Use lists, signs, family photos, clocks, and calendars as memory aids. Label cabinets and drawers.  Try to distract, not confront (www.alz.org) for more information. Follow-up care  Follow up with the person’s healthcare provider, or as advised.   When to seek medical advice  Call your healthcare provider right away if any of these occur:  · Frequent falls  · The person refuses to ea

## 2021-10-22 NOTE — PROGRESS NOTES
CHIEF COMPLAINT: Patient presents with:  Constipation        HPI:     Manasa Marrero is a 73year old male presents for constipation. Constipation: PT has had constipation x 1.5 wks. Last BM was 1.5 wks ago.   Wife has tried him on OTC laxative, suppos week      Comment: 1-3 izaiah rocha, including today     Drug use: No       Medications (Active prior to today's visit):  Current Outpatient Medications   Medication Sig Dispense Refill   • Thiamine HCl (VITAMIN B-1 OR) Take by mouth.      • amLODIPine Besy-Be stated age, well groomed. Physical Exam     LABS     No visits with results within 2 Month(s) from this visit.    Latest known visit with results is:   Orders Only on 01/12/2021   Component Date Value   • FECAL GLOBIN BY$IMMUNOCH* 01/12/2021 SEE NOTE Completed      Patient/Caregiver Education: There are no barriers to learning. Medical education done. Outcome: Patient verbalizes understanding and agrees with plan. Advised to call or RTC if symptoms persist or worsen.     Problem List:   Patient Active

## 2021-10-28 ENCOUNTER — LAB ENCOUNTER (OUTPATIENT)
Dept: LAB | Age: 71
End: 2021-10-28
Attending: FAMILY MEDICINE
Payer: MEDICARE

## 2021-10-28 PROCEDURE — 36415 COLL VENOUS BLD VENIPUNCTURE: CPT | Performed by: FAMILY MEDICINE

## 2021-10-28 PROCEDURE — 84443 ASSAY THYROID STIM HORMONE: CPT | Performed by: FAMILY MEDICINE

## 2021-10-28 PROCEDURE — 82607 VITAMIN B-12: CPT | Performed by: FAMILY MEDICINE

## 2021-10-28 PROCEDURE — 80061 LIPID PANEL: CPT | Performed by: FAMILY MEDICINE

## 2021-10-28 PROCEDURE — 83036 HEMOGLOBIN GLYCOSYLATED A1C: CPT | Performed by: FAMILY MEDICINE

## 2021-10-28 PROCEDURE — 80053 COMPREHEN METABOLIC PANEL: CPT | Performed by: FAMILY MEDICINE

## 2021-11-26 RX ORDER — AMLODIPINE BESYLATE AND BENAZEPRIL HYDROCHLORIDE 10; 40 MG/1; MG/1
1 CAPSULE ORAL DAILY
Qty: 90 CAPSULE | Refills: 0 | Status: SHIPPED | OUTPATIENT
Start: 2021-11-26

## 2021-11-26 NOTE — TELEPHONE ENCOUNTER
Pt's wife states pt needs refill on amlodipine-benazapril and a script for Shingles vaccine sent to pharmacy

## 2021-11-26 NOTE — TELEPHONE ENCOUNTER
Refill Passed Protocol:     Pt requesting refill of   Requested Prescriptions     Pending Prescriptions Disp Refills   • amLODIPine Besy-Benazepril HCl 10-40 MG Oral Cap 90 capsule 0     Sig: Take 1 capsule by mouth daily.    • Zoster Vaccine Live 19400 UNT

## 2021-12-07 PROBLEM — F02.80 ALZHEIMER'S DISEASE, UNSPECIFIED (HCC): Status: ACTIVE | Noted: 2021-12-07

## 2021-12-07 PROBLEM — R20.2 TINGLING OF BOTH FEET: Status: ACTIVE | Noted: 2021-12-07

## 2021-12-07 PROBLEM — G30.9 ALZHEIMER'S DISEASE, UNSPECIFIED (HCC): Status: ACTIVE | Noted: 2021-12-07

## 2021-12-07 NOTE — PROGRESS NOTES
Patient states decrease in short and long term memory. Patient states short term is worse. Patient denies headaches. Patient wife states most recent fall was about a month ago.

## 2021-12-07 NOTE — PROGRESS NOTES
[facility]27   Neurology; INITIAL CLINIC VISIT  2021    Max Vann Patient Status:  No patient class for patient encounter    6/15/1950 MRN [id number]   Location [address] [place] [address], [address] [place] [address] PCP Fredrick Paredes bleeding, a month ago, he drunk fell again, he did not want to check in hospital, he remains to drink heavily, he and wife fights a lot, wife took over his car key, He has poor balance and  tingling sensation in his feet, his balance is off, he repeated sa complaints or deficits  HEENT: denies nasal congestion, sinus pain or sore throat; no  hearing loss;  RESPIRATORY: denies shortness of breath, wheezing or cough   CARDIOVASCULAR: denies chest pain, no palpitations   GI: denies nausea, vomiting, constipatio sensations normal,        CN  VII:  Symmetric facial movement       CN VIII:  Normal hearing       CN IX, XI:  Normal Gag, uvula palate midline       CN XII:  SCM strong, equal shoulder shrug  Motor:  No drift, rapid finger movement symmetric.  5/5 in all l labs is negative,    Start Aricept 5 mg at bedtime,   Refer to psychology and psychiatrist    Return in about 3 months (around 3/7/2022). Review test    Up dose of aricept      We discussed in depth regarding diagnosis, prognosis, treatment.  The patient a

## 2022-01-03 ENCOUNTER — OFFICE VISIT (OUTPATIENT)
Dept: FAMILY MEDICINE CLINIC | Facility: CLINIC | Age: 72
End: 2022-01-03
Payer: MEDICARE

## 2022-01-03 VITALS
DIASTOLIC BLOOD PRESSURE: 80 MMHG | HEIGHT: 65 IN | RESPIRATION RATE: 18 BRPM | TEMPERATURE: 98 F | BODY MASS INDEX: 25.6 KG/M2 | HEART RATE: 91 BPM | WEIGHT: 153.63 LBS | SYSTOLIC BLOOD PRESSURE: 150 MMHG | OXYGEN SATURATION: 96 %

## 2022-01-03 DIAGNOSIS — E11.65 UNCONTROLLED TYPE 2 DIABETES MELLITUS WITH HYPERGLYCEMIA (HCC): Primary | ICD-10-CM

## 2022-01-03 DIAGNOSIS — F10.20 ALCOHOLISM (HCC): ICD-10-CM

## 2022-01-03 DIAGNOSIS — F03.91 DEMENTIA WITH BEHAVIORAL DISTURBANCE, UNSPECIFIED DEMENTIA TYPE (HCC): ICD-10-CM

## 2022-01-03 DIAGNOSIS — E66.3 OVERWEIGHT (BMI 25.0-29.9): ICD-10-CM

## 2022-01-03 DIAGNOSIS — K59.00 CONSTIPATION, UNSPECIFIED CONSTIPATION TYPE: ICD-10-CM

## 2022-01-03 DIAGNOSIS — Z12.11 COLON CANCER SCREENING: ICD-10-CM

## 2022-01-03 DIAGNOSIS — E78.5 HYPERLIPIDEMIA, UNSPECIFIED HYPERLIPIDEMIA TYPE: ICD-10-CM

## 2022-01-03 DIAGNOSIS — I10 BENIGN ESSENTIAL HYPERTENSION: ICD-10-CM

## 2022-01-03 PROCEDURE — 3079F DIAST BP 80-89 MM HG: CPT | Performed by: FAMILY MEDICINE

## 2022-01-03 PROCEDURE — 99397 PER PM REEVAL EST PAT 65+ YR: CPT | Performed by: FAMILY MEDICINE

## 2022-01-03 PROCEDURE — 3008F BODY MASS INDEX DOCD: CPT | Performed by: FAMILY MEDICINE

## 2022-01-03 PROCEDURE — 96160 PT-FOCUSED HLTH RISK ASSMT: CPT | Performed by: FAMILY MEDICINE

## 2022-01-03 PROCEDURE — 3077F SYST BP >= 140 MM HG: CPT | Performed by: FAMILY MEDICINE

## 2022-01-03 PROCEDURE — G0439 PPPS, SUBSEQ VISIT: HCPCS | Performed by: FAMILY MEDICINE

## 2022-01-03 RX ORDER — GLIMEPIRIDE 4 MG/1
4 TABLET ORAL
Qty: 90 TABLET | Refills: 0 | Status: SHIPPED | OUTPATIENT
Start: 2022-01-03

## 2022-01-05 NOTE — PROGRESS NOTES
REASON FOR VISIT:    Audi Lama is a 73year old male who presents for a MA Supervisit. Annual physical:  Overall, pt states he feels well today.     Sexually active: monogamous  Last PSA: 9/2020, normal  Last colonoscopy: overdue, prefers FIT test He states he thought the vodka was beer and drink a full liter by himself. He passed out in the garage- wife did not call the ambulance right away, Dtr called when she came home from work. Pt does not recall anything.  He states he was prompted by maldonado Puri [facility] ([facility]     Anxiety     Aggressiveness     Major depression, single episode     Memory deficit     Type 2 diabetes mellitus without complication ([facility]     Vitamin B12 deficiency (non anemic)     Vitamin D deficiency     Dorothy 10/28/2021 1/18/2021 10/4/2014 7/24/2014 1/15/2014 3/23/2007   AST 15 - 37 U/L 19 17 18 22 15 25   ALT 16 - 61 U/L 22 15 13 18 13 20     TSH and Free T4 Latest Ref Rng & Units 10/28/2021 1/18/2021   TSH 0.358 - 3.740 mIU/mL 0.536 0.88        General Health Annually Hemoglobin A1c (%)   Date Value   10/04/2014 6.6 (H)     HEMOGLOBIN A1c (% of total Hgb)   Date Value   01/18/2021 8.0 (H)     HgbA1C (%)   Date Value   10/28/2021 8.2 (H)       Fasting Blood Sugar (FSB)Annually Glucose (mg/dL)   Date Value   10/ Date Value   01/18/2021 1.08     Creatinine (mg/dL)   Date Value   10/28/2021 1.01       LDL  Annually LDL Cholesterol Calc (mg/dL)   Date Value   10/04/2014 48     LDL Cholesterol (mg/dL)   Date Value   10/28/2021 47     LDL-CHOLESTEROL (mg/dL (calc)) skin lesions  EYES: denies blurred vision or double vision  HEENT: denies nasal congestion, sinus pain or ST  LUNGS: denies shortness of breath with exertion  CARDIOVASCULAR: denies chest pain on exertion  GI: denies abdominal pain, denies heartburn  : d PLAN SUMMARY:   Diagnoses and all orders for this visit:    Uncontrolled type 2 diabetes mellitus with hyperglycemia ([facility]  - diabetes is poorly controlled  - last A1c in 10/2021 : 8.2  - last microalbumin: 1/2021, normal  - last diabetic foot exam: to to the plan.   The patient is asked to return in 3 months for office visit  Alcohol cessation couseling performed  Diet counseling perfomed  Exercise counseling perfomed  Ramirez aspirin Risk/Benefits counseling performed    [address] [facility]

## 2022-01-11 ENCOUNTER — NURSE ONLY (OUTPATIENT)
Dept: ENDOCRINOLOGY CLINIC | Facility: CLINIC | Age: 72
End: 2022-01-11
Payer: MEDICARE

## 2022-01-11 ENCOUNTER — TELEPHONE (OUTPATIENT)
Dept: ENDOCRINOLOGY CLINIC | Facility: CLINIC | Age: 72
End: 2022-01-11

## 2022-01-11 VITALS — BODY MASS INDEX: 25 KG/M2 | WEIGHT: 153.19 LBS

## 2022-01-11 DIAGNOSIS — E11.65 UNCONTROLLED TYPE 2 DIABETES MELLITUS WITH HYPERGLYCEMIA (HCC): ICD-10-CM

## 2022-01-11 DIAGNOSIS — E11.9 TYPE 2 DIABETES MELLITUS WITHOUT COMPLICATION, WITHOUT LONG-TERM CURRENT USE OF INSULIN (HCC): Primary | ICD-10-CM

## 2022-01-11 PROCEDURE — G0108 DIAB MANAGE TRN  PER INDIV: HCPCS | Performed by: DIETITIAN, REGISTERED

## 2022-01-11 RX ORDER — BLOOD-GLUCOSE METER
1 EACH MISCELLANEOUS ONCE
Qty: 1 KIT | Refills: 0 | Status: SHIPPED | OUTPATIENT
Start: 2022-01-11 | End: 2022-01-11

## 2022-01-11 RX ORDER — BLOOD SUGAR DIAGNOSTIC
STRIP MISCELLANEOUS
Qty: 100 EACH | Refills: 3 | Status: SHIPPED | OUTPATIENT
Start: 2022-01-11

## 2022-01-11 RX ORDER — LANCETS 30 GAUGE
1 EACH MISCELLANEOUS ONCE
Qty: 100 EACH | Refills: 3 | Status: SHIPPED | OUTPATIENT
Start: 2022-01-11 | End: 2022-01-11

## 2022-01-14 NOTE — PROGRESS NOTES
Vicky Munguia  : 6/15/1950 attended Step 1 Diabetic Education:  Pt.  Was accompanied by his wife to visit;it was difficult obtain information regmaldonado martinez food intake  Date: 2022  Referring Provider: Dr. Lili Romero  Start time: 2:30pm End trevor complications: taught symptoms of hypoglycemia, hyperglycemia, how to treat low blood sugar (Rule of 15) and actions for lowering high blood glucose levels.     Recommendations:  Start monitoring blood glucose once daily at varied times  Continue medication

## 2022-01-20 ENCOUNTER — NURSE ONLY (OUTPATIENT)
Dept: ELECTROPHYSIOLOGY | Facility: HOSPITAL | Age: 72
End: 2022-01-20
Attending: Other
Payer: MEDICARE

## 2022-01-20 DIAGNOSIS — R41.3 MEMORY DEFICIT: ICD-10-CM

## 2022-01-20 PROCEDURE — 95819 EEG AWAKE AND ASLEEP: CPT | Performed by: OTHER

## 2022-01-21 ENCOUNTER — TELEPHONE (OUTPATIENT)
Dept: NEUROLOGY | Facility: CLINIC | Age: 72
End: 2022-01-21

## 2022-01-21 NOTE — PROCEDURES
[address]      PATIENT'S NAME: Sid Clemente   ATTENDING PHYSICIAN: Lisette Duggan M.D.    PATIENT ACCOUNT #: [de-identified] LOCATION: [facility]   MEDICAL RECORD #: [id number] YOB: 1950

## 2022-02-16 ENCOUNTER — OFFICE VISIT (OUTPATIENT)
Dept: ELECTROPHYSIOLOGY | Facility: HOSPITAL | Age: 72
End: 2022-02-16
Attending: Other
Payer: MEDICARE

## 2022-02-16 DIAGNOSIS — R20.2 PARESTHESIA OF BOTH FEET: ICD-10-CM

## 2022-02-16 DIAGNOSIS — G62.9 SENSORY MOTOR NEUROPATHY: ICD-10-CM

## 2022-02-16 DIAGNOSIS — R20.2 TINGLING OF BOTH FEET: Primary | ICD-10-CM

## 2022-02-16 PROCEDURE — 95909 NRV CNDJ TST 5-6 STUDIES: CPT | Performed by: OTHER

## 2022-02-16 PROCEDURE — 95886 MUSC TEST DONE W/N TEST COMP: CPT | Performed by: OTHER

## 2022-02-17 NOTE — PROCEDURES
[facility], [address] [place] [address]      PATIENT'S NAME: Brian Gusman   REFERRING PHYSICIAN: Torres Gipson M.D. PATIENT ACCOUNT #: [de-identified] LOCATION: [facility]   MEDICAL RECORD #: [id number] YOB: 1950   DATE OF EXAM: 02/16/2022       ELECTRONEUROMYOGRAPHIC REPORT    CHIEF COMPLAINT:  This patient presented with tingling and numbness sensation in both feet as well as poor balance. EMG of lower extremities was requested to rule out neuropathy. INTERPRETATION:  Bilateral sural sensory responses were absent. Bilateral common peroneal motor amplitudes were reduced, with borderline normal conduction velocity in tibial nerves. The left peroneal motor conduction velocity was slightly slowed. F-wave latencies were unremarkable. Needle examination of right quadriceps, tibialis anterior, and gastrocnemius muscles was unremarkable. Needle examination of bilateral lumbar paraspinal muscles at L4-5, L5-S1 level was normal.     IMPRESSION:  This is an abnormal study. 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 There is no evidence of superimposed myopathy or lumbar radiculopathy demonstrated at this time.       Dictated By Torres Gipson M.D.  d:   02/16/2022 13:53:42  t:   02/16/2022 14:09:11  [facility]  [id number]  /

## 2022-03-02 ENCOUNTER — TELEPHONE (OUTPATIENT)
Dept: FAMILY MEDICINE CLINIC | Facility: CLINIC | Age: 72
End: 2022-03-02

## 2022-03-03 NOTE — TELEPHONE ENCOUNTER
.LMOM to return call to the office. Provided pt office phone [phone] along with office hours.

## 2022-03-03 NOTE — TELEPHONE ENCOUNTER
Spoke to Lucy pt wife, informed of referral authorization.  Arden voiced understanding denies questions or concerns at this time

## 2022-03-03 NOTE — TELEPHONE ENCOUNTER
Patient has a scheduled appointment for Neurosurgery 2022.  Previous referral from  Dr Cesar Holley  2022  New referral pended, sent to provider for review and signature

## 2022-03-16 ENCOUNTER — OFFICE VISIT (OUTPATIENT)
Dept: FAMILY MEDICINE CLINIC | Facility: CLINIC | Age: 72
End: 2022-03-16
Payer: MEDICARE

## 2022-03-16 VITALS
DIASTOLIC BLOOD PRESSURE: 90 MMHG | OXYGEN SATURATION: 97 % | BODY MASS INDEX: 25.79 KG/M2 | TEMPERATURE: 97 F | WEIGHT: 154.81 LBS | HEIGHT: 65 IN | SYSTOLIC BLOOD PRESSURE: 138 MMHG | HEART RATE: 76 BPM | RESPIRATION RATE: 18 BRPM

## 2022-03-16 DIAGNOSIS — Z02.9 ADMINISTRATIVE ENCOUNTER: Primary | ICD-10-CM

## 2022-03-22 ENCOUNTER — OFFICE VISIT (OUTPATIENT)
Dept: FAMILY MEDICINE CLINIC | Facility: CLINIC | Age: 72
End: 2022-03-22
Payer: MEDICARE

## 2022-03-22 VITALS
DIASTOLIC BLOOD PRESSURE: 84 MMHG | HEART RATE: 65 BPM | RESPIRATION RATE: 18 BRPM | OXYGEN SATURATION: 97 % | BODY MASS INDEX: 25.72 KG/M2 | TEMPERATURE: 97 F | HEIGHT: 65 IN | WEIGHT: 154.38 LBS | SYSTOLIC BLOOD PRESSURE: 140 MMHG

## 2022-03-22 DIAGNOSIS — E78.5 HYPERLIPIDEMIA, UNSPECIFIED HYPERLIPIDEMIA TYPE: ICD-10-CM

## 2022-03-22 DIAGNOSIS — F03.91 DEMENTIA WITH BEHAVIORAL DISTURBANCE, UNSPECIFIED DEMENTIA TYPE (HCC): ICD-10-CM

## 2022-03-22 DIAGNOSIS — M25.541 ARTHRALGIA OF RIGHT HAND: ICD-10-CM

## 2022-03-22 DIAGNOSIS — E11.65 UNCONTROLLED TYPE 2 DIABETES MELLITUS WITH HYPERGLYCEMIA (HCC): Primary | ICD-10-CM

## 2022-03-22 DIAGNOSIS — M79.605 PAIN IN LEFT LEG: ICD-10-CM

## 2022-03-22 DIAGNOSIS — I10 BENIGN ESSENTIAL HYPERTENSION: ICD-10-CM

## 2022-03-22 PROCEDURE — 3052F HG A1C>EQUAL 8.0%<EQUAL 9.0%: CPT | Performed by: FAMILY MEDICINE

## 2022-03-22 PROCEDURE — 3060F POS MICROALBUMINURIA REV: CPT | Performed by: FAMILY MEDICINE

## 2022-03-22 PROCEDURE — 99214 OFFICE O/P EST MOD 30 MIN: CPT | Performed by: FAMILY MEDICINE

## 2022-03-22 PROCEDURE — 3079F DIAST BP 80-89 MM HG: CPT | Performed by: FAMILY MEDICINE

## 2022-03-22 PROCEDURE — 3061F NEG MICROALBUMINURIA REV: CPT | Performed by: FAMILY MEDICINE

## 2022-03-22 PROCEDURE — 3077F SYST BP >= 140 MM HG: CPT | Performed by: FAMILY MEDICINE

## 2022-03-22 PROCEDURE — 3008F BODY MASS INDEX DOCD: CPT | Performed by: FAMILY MEDICINE

## 2022-03-22 RX ORDER — AMLODIPINE BESYLATE AND BENAZEPRIL HYDROCHLORIDE 10; 40 MG/1; MG/1
1 CAPSULE ORAL DAILY
Qty: 90 CAPSULE | Refills: 1 | Status: SHIPPED | OUTPATIENT
Start: 2022-03-22

## 2022-03-22 RX ORDER — GLIMEPIRIDE 4 MG/1
4 TABLET ORAL
Qty: 90 TABLET | Refills: 1 | Status: SHIPPED | OUTPATIENT
Start: 2022-03-22

## 2022-03-22 RX ORDER — ATORVASTATIN CALCIUM 10 MG/1
10 TABLET, FILM COATED ORAL NIGHTLY
Qty: 90 TABLET | Refills: 1 | Status: SHIPPED | OUTPATIENT
Start: 2022-03-22

## 2022-03-22 NOTE — PROGRESS NOTES
Patient is here to discuss EMG results. Patient states with motor skills. Take the antibiotic for 10 full days    Keep well hydrated    Nasacort AQ steroid nasal spray - 2 sprays each nostril once daily for 2 weeks    Plan to see ENT/Otolaryngologist AFTER completion of the antibiotic

## 2022-03-28 ENCOUNTER — TELEPHONE (OUTPATIENT)
Dept: FAMILY MEDICINE CLINIC | Facility: CLINIC | Age: 72
End: 2022-03-28

## 2022-03-28 NOTE — TELEPHONE ENCOUNTER
----- Message from Steven South MD sent at 3/26/2022 10:33 PM CDT -----  Please call to inform of results:  - diabetes is slightly worse, still uncontrolled A1c is 8.4. please start a new medication, called Jardiance 10 mg QAM. Side effects include: frequent UTIs, possible increased urination, nausea. Recheck A1c in 3 months, order placed in chart for late 6/2022.  - lipids borderline- before increasing meds, i'd like for him to work on regular exercise and cut back on carbs/sugars  - urine test shows kidneys are slightly affected from diabetes- this is reversible with better sugar control  - thyroid function normal  - Vitamin B12 level normal    Thanks.

## 2022-03-28 NOTE — TELEPHONE ENCOUNTER
Called pt to inform of test results, Patient requested we call Wife and discuss his test results with her.    Called wife, no answer LVM to return office call, provided office number

## 2022-03-29 NOTE — TELEPHONE ENCOUNTER
Spoke to pt's wife and let them know results and recommendation per Estrella Peck MD    If Tharon Cassia is not covered by insurance or too expensive co-pay please let us know so alternate med can be prescribed. Patient's wife voiced understanding.

## 2022-04-01 ENCOUNTER — HOSPITAL ENCOUNTER (OUTPATIENT)
Dept: GENERAL RADIOLOGY | Facility: HOSPITAL | Age: 72
Discharge: HOME | End: 2022-04-01
Attending: FAMILY MEDICINE
Payer: MEDICARE

## 2022-04-01 DIAGNOSIS — M79.605 PAIN IN LEFT LEG: ICD-10-CM

## 2022-04-01 DIAGNOSIS — M25.541 ARTHRALGIA OF RIGHT HAND: ICD-10-CM

## 2022-04-01 PROCEDURE — 73590 X-RAY EXAM OF LOWER LEG: CPT | Performed by: FAMILY MEDICINE

## 2022-04-01 PROCEDURE — 73560 X-RAY EXAM OF KNEE 1 OR 2: CPT | Performed by: FAMILY MEDICINE

## 2022-04-01 PROCEDURE — 73130 X-RAY EXAM OF HAND: CPT | Performed by: FAMILY MEDICINE

## 2022-04-04 ENCOUNTER — TELEPHONE (OUTPATIENT)
Dept: FAMILY MEDICINE CLINIC | Facility: CLINIC | Age: 72
End: 2022-04-04

## 2022-04-04 NOTE — TELEPHONE ENCOUNTER
----- Message from Elliott Vargas MD sent at 4/1/2022 11:08 PM CDT -----  Please call pt to inform of results:  - XR left hand shows he has arthritis changes. Unfortunately this is not reversible and he can manage pain with Tylenol or Ibuprofen prn    Edgar Nguyen MD  [facility] [place] Clinical Staff  Please call pt to inform of XR left knee and XR tib/fib:   - mild arthritis is seen. Unfortunately this is not reversible, but can manage pain in the meantime with Tylenol or Ibuprofen prn     Thanks.

## 2022-04-12 NOTE — TELEPHONE ENCOUNTER
Attempt #3 - left message for patient to return phone call. [employer] message also sent to patient.

## 2022-04-13 NOTE — TELEPHONE ENCOUNTER
LMOM to return call to the office. This was the 4th and final attempt to try and reach pt. Mailed patient a letter to contact office and letter also sent via [address] Closing encounter.

## 2022-06-02 ENCOUNTER — TELEPHONE (OUTPATIENT)
Dept: FAMILY MEDICINE CLINIC | Facility: CLINIC | Age: 72
End: 2022-06-02

## 2022-08-04 ENCOUNTER — OFFICE VISIT (OUTPATIENT)
Dept: FAMILY MEDICINE CLINIC | Facility: CLINIC | Age: 72
End: 2022-08-04
Payer: MEDICARE

## 2022-08-04 VITALS
TEMPERATURE: 98 F | SYSTOLIC BLOOD PRESSURE: 188 MMHG | BODY MASS INDEX: 25.02 KG/M2 | HEART RATE: 68 BPM | WEIGHT: 150.19 LBS | HEIGHT: 65 IN | DIASTOLIC BLOOD PRESSURE: 90 MMHG | OXYGEN SATURATION: 98 % | RESPIRATION RATE: 18 BRPM

## 2022-08-04 DIAGNOSIS — I10 BENIGN ESSENTIAL HYPERTENSION: ICD-10-CM

## 2022-08-04 DIAGNOSIS — F03.91 DEMENTIA WITH BEHAVIORAL DISTURBANCE, UNSPECIFIED DEMENTIA TYPE (HCC): ICD-10-CM

## 2022-08-04 DIAGNOSIS — E78.5 HYPERLIPIDEMIA, UNSPECIFIED HYPERLIPIDEMIA TYPE: ICD-10-CM

## 2022-08-04 DIAGNOSIS — E11.65 UNCONTROLLED TYPE 2 DIABETES MELLITUS WITH HYPERGLYCEMIA (HCC): Primary | ICD-10-CM

## 2022-08-04 PROCEDURE — 3080F DIAST BP >= 90 MM HG: CPT | Performed by: FAMILY MEDICINE

## 2022-08-04 PROCEDURE — 3077F SYST BP >= 140 MM HG: CPT | Performed by: FAMILY MEDICINE

## 2022-08-04 PROCEDURE — 3008F BODY MASS INDEX DOCD: CPT | Performed by: FAMILY MEDICINE

## 2022-08-04 PROCEDURE — 99214 OFFICE O/P EST MOD 30 MIN: CPT | Performed by: FAMILY MEDICINE

## 2022-08-04 PROCEDURE — 1126F AMNT PAIN NOTED NONE PRSNT: CPT | Performed by: FAMILY MEDICINE

## 2022-08-05 DIAGNOSIS — I10 BENIGN ESSENTIAL HYPERTENSION: ICD-10-CM

## 2022-08-05 DIAGNOSIS — E11.65 UNCONTROLLED TYPE 2 DIABETES MELLITUS WITH HYPERGLYCEMIA (HCC): ICD-10-CM

## 2022-08-05 DIAGNOSIS — E78.5 HYPERLIPIDEMIA, UNSPECIFIED HYPERLIPIDEMIA TYPE: ICD-10-CM

## 2022-08-05 NOTE — TELEPHONE ENCOUNTER
Patient had is blood work done needs his refills on his medication for 3 months he is leaving the country on Sunday.

## 2022-08-05 NOTE — TELEPHONE ENCOUNTER
LMOM to return call to the office. Provided pt office phone [phone]   Which meds need refilling?

## 2022-08-06 DIAGNOSIS — I10 BENIGN ESSENTIAL HYPERTENSION: Primary | ICD-10-CM

## 2022-08-06 DIAGNOSIS — E78.5 HYPERLIPIDEMIA, UNSPECIFIED HYPERLIPIDEMIA TYPE: ICD-10-CM

## 2022-08-06 DIAGNOSIS — E11.65 UNCONTROLLED TYPE 2 DIABETES MELLITUS WITH HYPERGLYCEMIA (HCC): ICD-10-CM

## 2022-08-06 LAB
ALBUMIN/GLOBULIN RATIO: 1.4 (CALC) (ref 1–2.5)
ALBUMIN: 4.2 G/DL (ref 3.6–5.1)
ALKALINE PHOSPHATASE: 73 U/L (ref 35–144)
ALT: 20 U/L (ref 9–46)
AST: 27 U/L (ref 10–35)
BILIRUBIN, TOTAL: 1 MG/DL (ref 0.2–1.2)
BUN: 9 MG/DL (ref 7–25)
CALCIUM: 9.4 MG/DL (ref 8.6–10.3)
CARBON DIOXIDE: 30 MMOL/L (ref 20–32)
CHLORIDE: 101 MMOL/L (ref 98–110)
CHOL/HDLC RATIO: 2.2 (CALC)
CHOLESTEROL, TOTAL: 146 MG/DL
CREATININE: 0.98 MG/DL (ref 0.7–1.28)
EGFR: 83 ML/MIN/1.73M2
GLOBULIN: 3.1 G/DL (CALC) (ref 1.9–3.7)
GLUCOSE: 189 MG/DL (ref 65–99)
HDL CHOLESTEROL: 66 MG/DL
HEMOGLOBIN A1C: 9.3 % OF TOTAL HGB
LDL-CHOLESTEROL: 53 MG/DL (CALC)
NON-HDL CHOLESTEROL: 80 MG/DL (CALC)
POTASSIUM: 4.5 MMOL/L (ref 3.5–5.3)
PROTEIN, TOTAL: 7.3 G/DL (ref 6.1–8.1)
SODIUM: 139 MMOL/L (ref 135–146)
TRIGLYCERIDES: 195 MG/DL

## 2022-08-09 ENCOUNTER — TELEPHONE (OUTPATIENT)
Dept: FAMILY MEDICINE CLINIC | Facility: CLINIC | Age: 72
End: 2022-08-09

## 2022-08-09 NOTE — TELEPHONE ENCOUNTER
----- Message from Ap Hein MD sent at 8/8/2022  8:12 AM CDT -----  Unviewed test results. Please call pt to inform of results message I sent. Thanks.

## 2022-09-22 NOTE — TELEPHONE ENCOUNTER
Spoke to pt's wife per HIPAA   Spoke to pt's wife and let them know results and recommendation per Dr Celina Powers  Pt's wife voiced understanding  Pt is out of the country at this time, expected to return early October

## 2022-09-22 NOTE — TELEPHONE ENCOUNTER
Spoke with pt's wife.  Pt is out of the country right now, expected to return early October  He went to [place] with plenty of medication, but upon returning he will be out.   meds refilled

## 2022-09-22 NOTE — TELEPHONE ENCOUNTER
LMOM on pt and pt's spouse's voicemail to return call to the office.  Provided pt office phone [phone]

## 2022-09-22 NOTE — TELEPHONE ENCOUNTER
LMOM on pt and pt's spouse's voicemail to return call to the office.  Provided pt office phone [phone]

## 2022-09-23 RX ORDER — ATORVASTATIN CALCIUM 10 MG/1
10 TABLET, FILM COATED ORAL NIGHTLY
Qty: 90 TABLET | Refills: 0 | Status: SHIPPED | OUTPATIENT
Start: 2022-09-23

## 2022-09-23 RX ORDER — AMLODIPINE BESYLATE AND BENAZEPRIL HYDROCHLORIDE 10; 40 MG/1; MG/1
1 CAPSULE ORAL DAILY
Qty: 90 CAPSULE | Refills: 0 | Status: SHIPPED | OUTPATIENT
Start: 2022-09-23

## 2022-09-23 RX ORDER — DONEPEZIL HYDROCHLORIDE 10 MG/1
10 TABLET, FILM COATED ORAL NIGHTLY
Qty: 90 TABLET | Refills: 1 | Status: SHIPPED | OUTPATIENT
Start: 2022-09-23

## 2022-09-23 RX ORDER — GLIMEPIRIDE 4 MG/1
4 TABLET ORAL
Qty: 90 TABLET | Refills: 1 | Status: SHIPPED | OUTPATIENT
Start: 2022-09-23

## 2022-11-11 ENCOUNTER — TELEPHONE (OUTPATIENT)
Dept: FAMILY MEDICINE CLINIC | Facility: CLINIC | Age: 72
End: 2022-11-11

## 2023-01-13 ENCOUNTER — TELEPHONE (OUTPATIENT)
Dept: FAMILY MEDICINE CLINIC | Facility: CLINIC | Age: 73
End: 2023-01-13

## 2023-03-07 ENCOUNTER — OFFICE VISIT (OUTPATIENT)
Dept: FAMILY MEDICINE CLINIC | Facility: CLINIC | Age: 73
End: 2023-03-07
Payer: MEDICARE

## 2023-03-07 VITALS
BODY MASS INDEX: 23.87 KG/M2 | HEART RATE: 82 BPM | SYSTOLIC BLOOD PRESSURE: 136 MMHG | HEIGHT: 64.12 IN | OXYGEN SATURATION: 95 % | TEMPERATURE: 98 F | WEIGHT: 139.81 LBS | DIASTOLIC BLOOD PRESSURE: 80 MMHG | RESPIRATION RATE: 18 BRPM

## 2023-03-07 DIAGNOSIS — E78.5 HYPERLIPIDEMIA, UNSPECIFIED HYPERLIPIDEMIA TYPE: ICD-10-CM

## 2023-03-07 DIAGNOSIS — F02.80 ALZHEIMER'S DISEASE, UNSPECIFIED (HCC): ICD-10-CM

## 2023-03-07 DIAGNOSIS — Z12.5 PROSTATE CANCER SCREENING: ICD-10-CM

## 2023-03-07 DIAGNOSIS — I10 BENIGN ESSENTIAL HYPERTENSION: ICD-10-CM

## 2023-03-07 DIAGNOSIS — Z23 NEED FOR VACCINATION: ICD-10-CM

## 2023-03-07 DIAGNOSIS — E11.65 UNCONTROLLED TYPE 2 DIABETES MELLITUS WITH HYPERGLYCEMIA (HCC): ICD-10-CM

## 2023-03-07 DIAGNOSIS — F10.20 ALCOHOLISM (HCC): ICD-10-CM

## 2023-03-07 DIAGNOSIS — Z12.11 COLON CANCER SCREENING: ICD-10-CM

## 2023-03-07 DIAGNOSIS — Z00.00 ANNUAL PHYSICAL EXAM: Primary | ICD-10-CM

## 2023-03-07 DIAGNOSIS — G30.9 ALZHEIMER'S DISEASE, UNSPECIFIED (HCC): ICD-10-CM

## 2023-03-07 PROBLEM — F10.929 ALCOHOL INTOXICATION (HCC): Status: ACTIVE | Noted: 2023-03-07

## 2023-03-07 PROBLEM — S09.90XA INJURY OF HEAD: Status: ACTIVE | Noted: 2023-03-07

## 2023-03-07 PROBLEM — F10.929 ALCOHOL INTOXICATION: Status: ACTIVE | Noted: 2023-03-07

## 2023-03-08 DIAGNOSIS — E78.5 HYPERLIPIDEMIA, UNSPECIFIED HYPERLIPIDEMIA TYPE: ICD-10-CM

## 2023-03-08 DIAGNOSIS — E11.65 UNCONTROLLED TYPE 2 DIABETES MELLITUS WITH HYPERGLYCEMIA (HCC): ICD-10-CM

## 2023-03-08 DIAGNOSIS — I10 BENIGN ESSENTIAL HYPERTENSION: ICD-10-CM

## 2023-03-08 RX ORDER — GLIMEPIRIDE 4 MG/1
4 TABLET ORAL
Qty: 90 TABLET | Refills: 1 | Status: SHIPPED | OUTPATIENT
Start: 2023-03-08

## 2023-03-08 RX ORDER — ATORVASTATIN CALCIUM 10 MG/1
10 TABLET, FILM COATED ORAL NIGHTLY
Qty: 90 TABLET | Refills: 0 | Status: SHIPPED | OUTPATIENT
Start: 2023-03-08

## 2023-03-08 RX ORDER — EMPAGLIFLOZIN 25 MG/1
25 TABLET, FILM COATED ORAL DAILY
Qty: 90 TABLET | Refills: 1 | Status: SHIPPED | OUTPATIENT
Start: 2023-03-08

## 2023-03-08 RX ORDER — DONEPEZIL HYDROCHLORIDE 10 MG/1
10 TABLET, FILM COATED ORAL NIGHTLY
Qty: 90 TABLET | Refills: 1 | Status: SHIPPED | OUTPATIENT
Start: 2023-03-08

## 2023-03-08 RX ORDER — AMLODIPINE BESYLATE AND BENAZEPRIL HYDROCHLORIDE 10; 40 MG/1; MG/1
1 CAPSULE ORAL DAILY
Qty: 90 CAPSULE | Refills: 0 | Status: SHIPPED | OUTPATIENT
Start: 2023-03-08

## 2023-03-08 NOTE — TELEPHONE ENCOUNTER
Pts daughter called office asking for a refill on pts all of his medications. Received patient in bed, patient is alert to self however she is very lethargic . She answers some questions however she falls asleep frequently. All medications were crushed in applesauce. Family is at the bedside and is concerned about the pt mental status. New orders for Mri was placed and IV bolus was given.  Pt has a poor appetite, and blood pressure is elevated . Gave all medications , tolerated well . Bed alarm activated. Call light within reach  Will continue to monitor

## 2023-03-09 PROCEDURE — 3061F NEG MICROALBUMINURIA REV: CPT | Performed by: FAMILY MEDICINE

## 2023-03-09 PROCEDURE — 3060F POS MICROALBUMINURIA REV: CPT | Performed by: FAMILY MEDICINE

## 2023-03-10 LAB
ALBUMIN/GLOBULIN RATIO: 1.1 (CALC) (ref 1–2.5)
ALBUMIN: 4.1 G/DL (ref 3.6–5.1)
ALKALINE PHOSPHATASE: 88 U/L (ref 35–144)
ALT: 13 U/L (ref 9–46)
AST: 19 U/L (ref 10–35)
BILIRUBIN, TOTAL: 1.2 MG/DL (ref 0.2–1.2)
BUN: 16 MG/DL (ref 7–25)
CALCIUM: 9.6 MG/DL (ref 8.6–10.3)
CARBON DIOXIDE: 29 MMOL/L (ref 20–32)
CHLORIDE: 99 MMOL/L (ref 98–110)
CHOL/HDLC RATIO: 2.3 (CALC)
CHOLESTEROL, TOTAL: 149 MG/DL
CREATININE, RANDOM URINE: 206 MG/DL (ref 20–320)
CREATININE: 0.97 MG/DL (ref 0.7–1.28)
EGFR: 84 ML/MIN/1.73M2
GLOBULIN: 3.9 G/DL (CALC) (ref 1.9–3.7)
GLUCOSE: 132 MG/DL (ref 65–99)
HDL CHOLESTEROL: 65 MG/DL
HEMOGLOBIN A1C: 7.8 % OF TOTAL HGB
LDL-CHOLESTEROL: 67 MG/DL (CALC)
MICROALBUMIN/CREATININE RATIO, RANDOM URINE: 30 MCG/MG CREAT
MICROALBUMIN: 6.2 MG/DL
NON-HDL CHOLESTEROL: 84 MG/DL (CALC)
POTASSIUM: 4.7 MMOL/L (ref 3.5–5.3)
PROTEIN, TOTAL: 8 G/DL (ref 6.1–8.1)
SODIUM: 138 MMOL/L (ref 135–146)
TRIGLYCERIDES: 85 MG/DL

## 2023-03-11 DIAGNOSIS — I10 BENIGN ESSENTIAL HYPERTENSION: ICD-10-CM

## 2023-03-11 DIAGNOSIS — E78.5 HYPERLIPIDEMIA, UNSPECIFIED HYPERLIPIDEMIA TYPE: ICD-10-CM

## 2023-03-11 DIAGNOSIS — E11.65 UNCONTROLLED TYPE 2 DIABETES MELLITUS WITH HYPERGLYCEMIA (HCC): Primary | ICD-10-CM

## 2023-03-14 ENCOUNTER — TELEPHONE (OUTPATIENT)
Dept: FAMILY MEDICINE CLINIC | Facility: CLINIC | Age: 73
End: 2023-03-14

## 2023-03-30 NOTE — TELEPHONE ENCOUNTER
LMOM to return call to the office as this is 2nd attempt to reach you. Provided pt office phone [phone] along with office hours.

## 2023-05-02 PROBLEM — F10.20 ALCOHOLISM (HCC): Status: ACTIVE | Noted: 2023-03-07

## 2023-05-02 PROBLEM — F10.21 ALCOHOL DEPENDENCE IN REMISSION (HCC): Status: RESOLVED | Noted: 2017-11-21 | Resolved: 2023-05-02

## 2023-06-19 DIAGNOSIS — I10 BENIGN ESSENTIAL HYPERTENSION: ICD-10-CM

## 2023-06-19 DIAGNOSIS — E78.5 HYPERLIPIDEMIA, UNSPECIFIED HYPERLIPIDEMIA TYPE: ICD-10-CM

## 2023-06-19 RX ORDER — ATORVASTATIN CALCIUM 10 MG/1
TABLET, FILM COATED ORAL
Qty: 90 TABLET | Refills: 1 | Status: SHIPPED | OUTPATIENT
Start: 2023-06-19

## 2023-06-19 RX ORDER — AMLODIPINE BESYLATE AND BENAZEPRIL HYDROCHLORIDE 10; 40 MG/1; MG/1
1 CAPSULE ORAL DAILY
Qty: 90 CAPSULE | Refills: 1 | Status: SHIPPED | OUTPATIENT
Start: 2023-06-19

## 2023-06-21 ENCOUNTER — TELEPHONE (OUTPATIENT)
Dept: FAMILY MEDICINE CLINIC | Facility: CLINIC | Age: 73
End: 2023-06-21

## 2023-06-21 ENCOUNTER — OFFICE VISIT (OUTPATIENT)
Dept: FAMILY MEDICINE CLINIC | Facility: CLINIC | Age: 73
End: 2023-06-21
Payer: MEDICARE

## 2023-06-21 VITALS
RESPIRATION RATE: 20 BRPM | SYSTOLIC BLOOD PRESSURE: 146 MMHG | HEART RATE: 84 BPM | OXYGEN SATURATION: 97 % | DIASTOLIC BLOOD PRESSURE: 89 MMHG | BODY MASS INDEX: 24.59 KG/M2 | TEMPERATURE: 98 F | WEIGHT: 144 LBS | HEIGHT: 64 IN

## 2023-06-21 DIAGNOSIS — E11.65 UNCONTROLLED TYPE 2 DIABETES MELLITUS WITH HYPERGLYCEMIA (HCC): Primary | ICD-10-CM

## 2023-06-21 DIAGNOSIS — F02.80 ALZHEIMER'S DISEASE, UNSPECIFIED (HCC): ICD-10-CM

## 2023-06-21 DIAGNOSIS — E78.5 HYPERLIPIDEMIA, UNSPECIFIED HYPERLIPIDEMIA TYPE: ICD-10-CM

## 2023-06-21 DIAGNOSIS — G30.9 ALZHEIMER'S DISEASE, UNSPECIFIED (HCC): ICD-10-CM

## 2023-06-21 DIAGNOSIS — I10 BENIGN ESSENTIAL HYPERTENSION: ICD-10-CM

## 2023-06-21 DIAGNOSIS — L98.9 SKIN LESION OF FACE: ICD-10-CM

## 2023-06-21 DIAGNOSIS — F10.20 ALCOHOLISM (HCC): ICD-10-CM

## 2023-06-21 PROCEDURE — 3079F DIAST BP 80-89 MM HG: CPT | Performed by: FAMILY MEDICINE

## 2023-06-21 PROCEDURE — 3077F SYST BP >= 140 MM HG: CPT | Performed by: FAMILY MEDICINE

## 2023-06-21 PROCEDURE — 1160F RVW MEDS BY RX/DR IN RCRD: CPT | Performed by: FAMILY MEDICINE

## 2023-06-21 PROCEDURE — 1170F FXNL STATUS ASSESSED: CPT | Performed by: FAMILY MEDICINE

## 2023-06-21 PROCEDURE — 1126F AMNT PAIN NOTED NONE PRSNT: CPT | Performed by: FAMILY MEDICINE

## 2023-06-21 PROCEDURE — 99214 OFFICE O/P EST MOD 30 MIN: CPT | Performed by: FAMILY MEDICINE

## 2023-06-21 PROCEDURE — 3008F BODY MASS INDEX DOCD: CPT | Performed by: FAMILY MEDICINE

## 2023-06-21 PROCEDURE — 1159F MED LIST DOCD IN RCRD: CPT | Performed by: FAMILY MEDICINE

## 2023-06-21 RX ORDER — EMPAGLIFLOZIN 25 MG/1
25 TABLET, FILM COATED ORAL DAILY
Qty: 90 TABLET | Refills: 1 | Status: SHIPPED | OUTPATIENT
Start: 2023-06-21

## 2023-06-21 RX ORDER — GLIMEPIRIDE 4 MG/1
4 TABLET ORAL
Qty: 90 TABLET | Refills: 1 | Status: SHIPPED | OUTPATIENT
Start: 2023-06-21

## 2023-06-21 RX ORDER — BLOOD SUGAR DIAGNOSTIC
STRIP MISCELLANEOUS
Qty: 100 EACH | Refills: 3 | Status: SHIPPED | OUTPATIENT
Start: 2023-06-21

## 2023-06-21 RX ORDER — DONEPEZIL HYDROCHLORIDE 10 MG/1
10 TABLET, FILM COATED ORAL NIGHTLY
Qty: 90 TABLET | Refills: 1 | Status: SHIPPED | OUTPATIENT
Start: 2023-06-21

## 2023-06-21 RX ORDER — LANCETS 33 GAUGE
EACH MISCELLANEOUS
Qty: 200 EACH | Refills: 3 | Status: SHIPPED | OUTPATIENT
Start: 2023-06-21

## 2023-06-22 PROCEDURE — 3051F HG A1C>EQUAL 7.0%<8.0%: CPT | Performed by: FAMILY MEDICINE

## 2023-06-22 NOTE — TELEPHONE ENCOUNTER
[facility] pharmacist called office asking for a call back to get clarifications on pts test strips and directions.

## 2023-06-23 LAB
ALBUMIN/GLOBULIN RATIO: 1.3 (CALC) (ref 1–2.5)
ALBUMIN: 4.3 G/DL (ref 3.6–5.1)
ALKALINE PHOSPHATASE: 78 U/L (ref 35–144)
ALT: 25 U/L (ref 9–46)
AST: 23 U/L (ref 10–35)
BILIRUBIN, TOTAL: 1 MG/DL (ref 0.2–1.2)
BUN: 16 MG/DL (ref 7–25)
CALCIUM: 9.7 MG/DL (ref 8.6–10.3)
CARBON DIOXIDE: 31 MMOL/L (ref 20–32)
CHLORIDE: 100 MMOL/L (ref 98–110)
CHOL/HDLC RATIO: 2.8 (CALC)
CHOLESTEROL, TOTAL: 141 MG/DL
CREATININE: 1 MG/DL (ref 0.7–1.28)
EGFR: 80 ML/MIN/1.73M2
GLOBULIN: 3.4 G/DL (CALC) (ref 1.9–3.7)
GLUCOSE: 140 MG/DL (ref 65–99)
HDL CHOLESTEROL: 51 MG/DL
HEMOGLOBIN A1C: 7.1 % OF TOTAL HGB
LDL-CHOLESTEROL: 68 MG/DL (CALC)
NON-HDL CHOLESTEROL: 90 MG/DL (CALC)
POTASSIUM: 4.8 MMOL/L (ref 3.5–5.3)
PROTEIN, TOTAL: 7.7 G/DL (ref 6.1–8.1)
PSA, TOTAL: 2.44 NG/ML
SODIUM: 139 MMOL/L (ref 135–146)
TRIGLYCERIDES: 132 MG/DL

## 2023-06-26 ENCOUNTER — TELEPHONE (OUTPATIENT)
Dept: FAMILY MEDICINE CLINIC | Facility: CLINIC | Age: 73
End: 2023-06-26

## 2023-06-26 NOTE — TELEPHONE ENCOUNTER
LMOM to return call to the office. Provided pt office phone [phone] along with office hours. Written by Lisha Jones MD on 6/24/2023 12:42 AM CDT   Please call pt to inform:  - diabetes is improved, very close to good control, HgbA1c is 7.1 (goal is 6.5-7.0)  - please continue the same medications , remember to take regularly as prescribed, and continue to eat a healthy, low carb, low sugar diet with regular exercise  - cholesterol levels are stable  - prostate Ca screening test is negative     Please recheck labs in 3 months just prior to a  f-u appt with me. Lab orders are in chart for 10/2023. Thanks.

## 2023-08-02 ENCOUNTER — TELEPHONE (OUTPATIENT)
Dept: FAMILY MEDICINE CLINIC | Facility: CLINIC | Age: 73
End: 2023-08-02

## 2023-08-02 NOTE — TELEPHONE ENCOUNTER
Daughter called, asking for referral for Podiatry. Dr. Flaca Rodríguez information was provider. Daughter v/u.

## 2023-09-05 ENCOUNTER — OFFICE VISIT (OUTPATIENT)
Dept: PODIATRY CLINIC | Facility: CLINIC | Age: 73
End: 2023-09-05

## 2023-09-05 ENCOUNTER — TELEPHONE (OUTPATIENT)
Dept: CASE MANAGEMENT | Age: 73
End: 2023-09-05

## 2023-09-05 ENCOUNTER — TELEPHONE (OUTPATIENT)
Dept: FAMILY MEDICINE CLINIC | Facility: CLINIC | Age: 73
End: 2023-09-05

## 2023-09-05 DIAGNOSIS — M20.12 VALGUS DEFORMITY OF BOTH GREAT TOES: ICD-10-CM

## 2023-09-05 DIAGNOSIS — L60.0 ONYCHOCRYPTOSIS: ICD-10-CM

## 2023-09-05 DIAGNOSIS — E11.65 UNCONTROLLED TYPE 2 DIABETES MELLITUS WITH HYPERGLYCEMIA (HCC): Primary | ICD-10-CM

## 2023-09-05 DIAGNOSIS — M79.605 PAIN IN LEFT LEG: ICD-10-CM

## 2023-09-05 DIAGNOSIS — B35.1 ONYCHOMYCOSIS: ICD-10-CM

## 2023-09-05 DIAGNOSIS — E11.9 TYPE 2 DIABETES MELLITUS WITHOUT COMPLICATION, WITHOUT LONG-TERM CURRENT USE OF INSULIN (HCC): Primary | ICD-10-CM

## 2023-09-05 DIAGNOSIS — E11.65 TYPE 2 DIABETES MELLITUS WITH HYPERGLYCEMIA, UNSPECIFIED WHETHER LONG TERM INSULIN USE (HCC): Primary | ICD-10-CM

## 2023-09-05 DIAGNOSIS — R20.2 TINGLING OF BOTH FEET: ICD-10-CM

## 2023-09-05 DIAGNOSIS — M20.11 VALGUS DEFORMITY OF BOTH GREAT TOES: ICD-10-CM

## 2023-09-05 PROCEDURE — 1159F MED LIST DOCD IN RCRD: CPT | Performed by: PODIATRIST

## 2023-09-05 PROCEDURE — 99203 OFFICE O/P NEW LOW 30 MIN: CPT | Performed by: PODIATRIST

## 2023-09-05 NOTE — TELEPHONE ENCOUNTER
Dr. Azeem Weems,     Dr. Hardwick [facility] office is requesting a referral for patient for foot care, appointment 9/5/23, patient at appointment. Pended referral please review diagnosis and sign off if you agree. Thank you.   Cam Grullon

## 2023-09-09 NOTE — PROGRESS NOTES
Vicky Munguia is a 72year old male. Patient presents with:  Diabetic Foot Care: Consult- on  6/22/23 HgA1C= 7.1, patient does not check BS- does not have complains        HPI:   Romi gentleman presents to the clinic with a chief complaint of having related toenails as well as occasional tingling in his feet from history. He is here for diabetic foot screening and care. At today's visit reviewed nurse's history as taken above, allergies medications and medical history as documented below. All changes duly noted  Allergies: Patient has no known allergies. Current Outpatient Medications   Medication Sig Dispense Refill    metFORMIN HCl 1000 MG Oral Tab Take 1 tablet (1,000 mg total) by mouth 2 (two) times daily with meals. 180 tablet 1    glimepiride 4 MG Oral Tab Take 1 tablet (4 mg total) by mouth daily with breakfast. 90 tablet 1    Empagliflozin (JARDIANCE) 25 MG Oral Tab Take 25 mg by mouth daily. 90 tablet 1    donepezil 10 MG Oral Tab Take 1 tablet (10 mg total) by mouth nightly. 90 tablet 1    Glucose Blood (ONETOUCH ULTRA) In Vitro Strip Check blood sugar once daily 100 each 3    OneTouch Delica Lancets 27S Does not apply Misc Use to check blood sugar  each 3    AMLODIPINE BESY-BENAZEPRIL HCL 10-40 MG Oral Cap TAKE 1 CAPSULE BY MOUTH DAILY 90 capsule 1    ATORVASTATIN 10 MG Oral Tab TAKE 1 TABLET(10 MG) BY MOUTH EVERY NIGHT 90 tablet 1    Thiamine HCl (VITAMIN B-1 OR) Take by mouth. Blood Glucose Monitoring Suppl (ONETOUCH ULTRA 2) W/DEVICE Does not apply Kit Use as directed 1 kit 0      Past Medical History:   Diagnosis Date    Diabetes ([address]     Diabetes mellitus ([address] 2/12/2014    Hyperlipidemia 7/25/2014    Hypertension       History reviewed. No pertinent surgical history.    Family History   Problem Relation Age of Onset    Cancer Father         bone marrow CA    Diabetes Sister     Heart Disorder Brother     Hypertension Brother     Heart Disorder Brother       Social History Socioeconomic History      Marital status:       Number of children: 3    Occupational History      Occupation:     Tobacco Use      Smoking status: Never      Smokeless tobacco: Never    Vaping Use      Vaping Use: Never used    Substance and Sexual Activity      Alcohol use: Yes        Comment: Patient states he has one beer a few days per week. His daughter states that he actually has up to 10 beers and has been blacking out. Patient states last drink was 3 days ago. BAL is 0. Drug use: No    Other Topics      Concerns:        Caffeine Concern: Yes          1-2 cups coffee daily        Exercise: No          REVIEW OF SYSTEMS:   Today reviewed systens as documented below  GENERAL HEALTH: feels well otherwise  SKIN: Refer to exam below  RESPIRATORY: denies shortness of breath with exertion  CARDIOVASCULAR: denies chest pain on exertion  GI: denies abdominal pain and denies heartburn  NEURO: denies headaches    EXAM:   There were no vitals taken for this visit. GENERAL: well developed, well nourished, in no apparent distress  EXTREMITIES:   1. Integument: The skin on his feet is warm and dry. His nails 1-5 are thickened mildly and elongated medial lateral nail borders of the hallux are incurvated with some hyperkeratotic impaction. 2. Vascular: The patient has palpable pulses dorsalis pedis and posterior tibial bilateral   3. Neurologic: The patient can feel the [place]-Hermes 10 g filament in most dermatomes but occasionally does complain of tingling in his feet more of a subjective type of a neuropathy. 4. Musculoskeletal: Patient has a valgus deformity on both feet with mild hyperkeratosis at the medial plantar aspect of the hallux.     ASSESSMENT AND PLAN:   Diagnoses and all orders for this visit:    Type 2 diabetes mellitus without complication, without long-term current use of insulin ([facility])    Onychomycosis    Onychocryptosis    Valgus deformity of both great toes    Tingling of both feet        Plan: Today using a nail nippers were trimmed and debrided toenails 1-5 manually and mechanically in girth and width as far down to healthy tissue as possible on both feet. This was done uneventfully there was no hemorrhage. There is mild incurvation of the medial and lateral nail borders of the hallux which using a slant back technique were removed and they would not get ingrown. At today's visit I reminded the patient about daily foot checks  Reminded patient again of proper control of his diabetes to help prevent any severe complications in his feet  Proper foot hygiene moisturizing except between the toes was reviewed  Advised follow-up again every 2 to 3 months for routine care but emphasized to him the importance of coming in sooner if he notices any problems. The patient indicates understanding of these issues and agrees to the plan.     Katy Naranjo DPM

## 2023-09-12 DIAGNOSIS — E11.65 UNCONTROLLED TYPE 2 DIABETES MELLITUS WITH HYPERGLYCEMIA (HCC): ICD-10-CM

## 2023-09-12 RX ORDER — EMPAGLIFLOZIN 25 MG/1
1 TABLET, FILM COATED ORAL DAILY
Qty: 90 TABLET | Refills: 1 | Status: SHIPPED | OUTPATIENT
Start: 2023-09-12

## 2024-01-30 ENCOUNTER — TELEPHONE (OUTPATIENT)
Dept: FAMILY MEDICINE CLINIC | Facility: CLINIC | Age: 74
End: 2024-01-30

## 2024-01-30 NOTE — TELEPHONE ENCOUNTER
Unable to reach patient for medication adherence consult. LVM for patient to call me back at [phone].

## 2024-02-14 ENCOUNTER — TELEPHONE (OUTPATIENT)
Dept: FAMILY MEDICINE CLINIC | Facility: CLINIC | Age: 74
End: 2024-02-14

## 2024-03-27 ENCOUNTER — TELEPHONE (OUTPATIENT)
Dept: FAMILY MEDICINE CLINIC | Facility: CLINIC | Age: 74
End: 2024-03-27

## 2024-04-08 DIAGNOSIS — E11.65 UNCONTROLLED TYPE 2 DIABETES MELLITUS WITH HYPERGLYCEMIA (HCC): ICD-10-CM

## 2024-04-08 RX ORDER — EMPAGLIFLOZIN 25 MG/1
1 TABLET, FILM COATED ORAL DAILY
Qty: 30 TABLET | Refills: 0 | Status: SHIPPED | OUTPATIENT
Start: 2024-04-08

## 2024-04-08 NOTE — TELEPHONE ENCOUNTER
Refill Failed Protocol:     Pt requesting refill of   Requested Prescriptions     Pending Prescriptions Disp Refills    JARDIANCE 25 MG Oral Tab [Pharmacy Med Name: JARDIANCE 25MG TABLETS] 90 tablet 1     Sig: TAKE 1 TABLET(25MG) BY MOUTH DAILY     Last Time Medication was Filled:  9/12/2023    Last Office Visit with Provider: 6/21/2023    Unable to approve refill,     Diabetes Medication Protocol [id number]/2024 09:02 AM   Protocol Details Last A1C < 7.5 and within past 6 months    In person appointment or virtual visit in the past 6 mos or appointment in next 3 mos    Microalbumin procedure in past 12 months or taking ACE/ARB    EGFRCR or GFRNAA > 50    GFR in the past 12 months            No future appointments.

## 2024-04-08 NOTE — TELEPHONE ENCOUNTER
Only filled 30-day supply since last seen 6/2023, was supposed to f-u with rpt labs in 9/2023. Has not been back since then.  Needs to complete labs before f-u appt. Please schedule. Thanks.

## 2024-05-14 ENCOUNTER — TELEPHONE (OUTPATIENT)
Dept: FAMILY MEDICINE CLINIC | Facility: CLINIC | Age: 74
End: 2024-05-14

## 2024-05-22 ENCOUNTER — TELEPHONE (OUTPATIENT)
Dept: FAMILY MEDICINE CLINIC | Facility: CLINIC | Age: 74
End: 2024-05-22

## 2024-05-22 NOTE — TELEPHONE ENCOUNTER
Unable to reach patient for medication adherence consult. LVM for patient to call me back at [phone].

## 2024-05-30 DIAGNOSIS — F02.80 ALZHEIMER'S DISEASE, UNSPECIFIED (HCC): ICD-10-CM

## 2024-05-30 DIAGNOSIS — G30.9 ALZHEIMER'S DISEASE, UNSPECIFIED (HCC): ICD-10-CM

## 2024-05-30 DIAGNOSIS — E11.65 UNCONTROLLED TYPE 2 DIABETES MELLITUS WITH HYPERGLYCEMIA (HCC): ICD-10-CM

## 2024-05-30 DIAGNOSIS — E78.5 HYPERLIPIDEMIA, UNSPECIFIED HYPERLIPIDEMIA TYPE: ICD-10-CM

## 2024-05-30 DIAGNOSIS — I10 BENIGN ESSENTIAL HYPERTENSION: ICD-10-CM

## 2024-05-30 RX ORDER — ATORVASTATIN CALCIUM 10 MG/1
10 TABLET, FILM COATED ORAL NIGHTLY
Qty: 30 TABLET | Refills: 0 | Status: SHIPPED | OUTPATIENT
Start: 2024-05-30

## 2024-05-30 RX ORDER — GLIMEPIRIDE 4 MG/1
4 TABLET ORAL
Qty: 30 TABLET | Refills: 0 | Status: SHIPPED | OUTPATIENT
Start: 2024-05-30

## 2024-05-30 RX ORDER — AMLODIPINE AND BENAZEPRIL HYDROCHLORIDE 10; 40 MG/1; MG/1
1 CAPSULE ORAL DAILY
Qty: 30 CAPSULE | Refills: 0 | Status: SHIPPED | OUTPATIENT
Start: 2024-05-30

## 2024-05-30 RX ORDER — DONEPEZIL HYDROCHLORIDE 10 MG/1
10 TABLET, FILM COATED ORAL NIGHTLY
Qty: 30 TABLET | Refills: 0 | Status: SHIPPED | OUTPATIENT
Start: 2024-05-30

## 2024-05-30 RX ORDER — EMPAGLIFLOZIN 25 MG/1
1 TABLET, FILM COATED ORAL DAILY
Qty: 30 TABLET | Refills: 0 | Status: SHIPPED | OUTPATIENT
Start: 2024-05-30

## 2024-05-30 NOTE — TELEPHONE ENCOUNTER
Future Appointments   Date Time Provider Department Center   6/13/2024 12:20 PM Marianne Jones MD [facility] [place]     Routed to Dr. Jones for approval.

## 2024-06-12 PROBLEM — H35.372 EPIRETINAL MEMBRANE (ERM) OF LEFT EYE: Status: ACTIVE | Noted: 2024-06-12

## 2024-06-12 PROBLEM — IMO0001 DIABETIC RETINOPATHY NOT DETECTED: Status: ACTIVE | Noted: 2024-06-12

## 2024-06-12 PROBLEM — H25.13 AGE-RELATED NUCLEAR CATARACT, BILATERAL: Status: ACTIVE | Noted: 2024-06-12

## 2024-06-12 PROBLEM — E11.9 DIABETIC RETINOPATHY NOT DETECTED (HCC): Status: ACTIVE | Noted: 2024-06-12

## 2024-06-13 ENCOUNTER — OFFICE VISIT (OUTPATIENT)
Dept: FAMILY MEDICINE CLINIC | Facility: CLINIC | Age: 74
End: 2024-06-13
Payer: MEDICARE

## 2024-06-13 VITALS
BODY MASS INDEX: 23.11 KG/M2 | DIASTOLIC BLOOD PRESSURE: 80 MMHG | HEART RATE: 87 BPM | WEIGHT: 135.38 LBS | HEIGHT: 64 IN | TEMPERATURE: 97 F | OXYGEN SATURATION: 96 % | RESPIRATION RATE: 16 BRPM | SYSTOLIC BLOOD PRESSURE: 144 MMHG

## 2024-06-13 DIAGNOSIS — F02.80 ALZHEIMER'S DISEASE, UNSPECIFIED (HCC): ICD-10-CM

## 2024-06-13 DIAGNOSIS — G30.9 ALZHEIMER'S DISEASE, UNSPECIFIED (HCC): ICD-10-CM

## 2024-06-13 DIAGNOSIS — Z12.11 COLON CANCER SCREENING: ICD-10-CM

## 2024-06-13 DIAGNOSIS — Z00.00 ANNUAL PHYSICAL EXAM: Primary | ICD-10-CM

## 2024-06-13 DIAGNOSIS — E11.65 UNCONTROLLED TYPE 2 DIABETES MELLITUS WITH HYPERGLYCEMIA (HCC): ICD-10-CM

## 2024-06-13 DIAGNOSIS — J90 PLEURAL EFFUSION: ICD-10-CM

## 2024-06-13 DIAGNOSIS — I10 BENIGN ESSENTIAL HYPERTENSION: ICD-10-CM

## 2024-06-13 DIAGNOSIS — F10.20 ALCOHOLISM (HCC): ICD-10-CM

## 2024-06-13 DIAGNOSIS — E78.5 HYPERLIPIDEMIA, UNSPECIFIED HYPERLIPIDEMIA TYPE: ICD-10-CM

## 2024-06-13 RX ORDER — EMPAGLIFLOZIN 25 MG/1
1 TABLET, FILM COATED ORAL DAILY
Qty: 90 TABLET | Refills: 1 | Status: SHIPPED | OUTPATIENT
Start: 2024-06-13

## 2024-06-13 RX ORDER — DONEPEZIL HYDROCHLORIDE 10 MG/1
10 TABLET, FILM COATED ORAL NIGHTLY
Qty: 90 TABLET | Refills: 1 | Status: SHIPPED | OUTPATIENT
Start: 2024-06-13
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
## 2024-06-17 ENCOUNTER — HOSPITAL ENCOUNTER (OUTPATIENT)
Dept: GENERAL RADIOLOGY | Age: 74
Discharge: HOME | End: 2024-06-17
Attending: FAMILY MEDICINE

## 2024-06-17 DIAGNOSIS — J90 PLEURAL EFFUSION: ICD-10-CM

## 2024-06-17 PROCEDURE — 71046 X-RAY EXAM CHEST 2 VIEWS: CPT | Performed by: FAMILY MEDICINE

## 2024-06-19 ENCOUNTER — TELEPHONE (OUTPATIENT)
Dept: FAMILY MEDICINE CLINIC | Facility: CLINIC | Age: 74
End: 2024-06-19

## 2024-06-19 NOTE — TELEPHONE ENCOUNTER
Called patient LM of her normal CXR results. Phone number was provided if patient has any questions        ----- Message from Marianne Jones sent at 6/19/2024  2:37 AM CDT -----  Please call pt to inform:  - xray of chest is negative for any pneumonia.    No further testing needed.    Thanks.

## 2024-07-24 ENCOUNTER — TELEPHONE (OUTPATIENT)
Dept: FAMILY MEDICINE CLINIC | Facility: CLINIC | Age: 74
End: 2024-07-24

## 2024-07-24 NOTE — TELEPHONE ENCOUNTER
Pts spouse called office stating that they tried calling the eye dr, Dr. Mercer and were told that there is no referral for pt and they need to bring one in.    Pts spouse states that they also need a referral for Neuro since Alexandra is not working.

## 2024-07-25 NOTE — TELEPHONE ENCOUNTER
Referral faxed to Dr. Mercer's office @ [phone].    Neurology referral switched to:  Referred to Provider Information:  Provider Address Phone   Maya Padron MD [address] [place] [address]   [address] [address] [phone]     Left message on voicemail to call office back.

## 2024-07-30 NOTE — TELEPHONE ENCOUNTER
Left voicemail to return call to the office as this is my 2nd attempt to try to reach you. Provided pt office phone [phone] along with office hours given.

## 2024-08-06 ENCOUNTER — TELEPHONE (OUTPATIENT)
Dept: FAMILY MEDICINE CLINIC | Facility: CLINIC | Age: 74
End: 2024-08-06

## 2024-08-06 NOTE — TELEPHONE ENCOUNTER
Incoming fax received form [facility] drug therapy alert for the following medication Atorvastatin 10 mg.

## 2024-08-14 NOTE — TELEPHONE ENCOUNTER
Outreach #3 left message on voicemail for Arden to call office back.     Spoke with spouse patient has upcoming appointment with neurology.  9/24/2024 1:45 PM Maya Padron MD [facility]

## 2024-08-29 ENCOUNTER — TELEPHONE (OUTPATIENT)
Dept: FAMILY MEDICINE CLINIC | Facility: CLINIC | Age: 74
End: 2024-08-29

## 2024-08-29 NOTE — TELEPHONE ENCOUNTER
Unable to reach patient for medication adherence consult. LVM for patient to call me back at [phone].

## 2024-09-26 ENCOUNTER — TELEPHONE (OUTPATIENT)
Dept: FAMILY MEDICINE CLINIC | Facility: CLINIC | Age: 74
End: 2024-09-26

## 2024-09-26 NOTE — TELEPHONE ENCOUNTER
Patients daughter calling, patient other daughter is filing [place] paperwork to take care of him and she needs a note from doctor stating that he is under the care of Dr Maxwell and that he has dementia/Alzheimer.  Please call Simran with any questions at [phone]

## 2024-09-26 NOTE — TELEPHONE ENCOUNTER
Last office visit 6/13/24  Next appointment  was due this month    Ok to write letter or do you need to see patient first.     Sent to Dr. Jones for approval.

## 2024-09-27 NOTE — TELEPHONE ENCOUNTER
Ok to write letter, if that is all she needs. But if dtr needs the [facility] paperwork compelted, then that needs an appt and the forms will be submitted to the Forms Dept.    Thanks.

## 2024-09-27 NOTE — TELEPHONE ENCOUNTER
Spoke to Simran, informed of provider indications. Simran voiced understanding. States the request is for completion of FMLA forms. Understand an appointment is needed and will have her sister call back to request appointment time since sister is the one completing FMLA

## 2024-10-11 ENCOUNTER — TELEPHONE (OUTPATIENT)
Dept: NEUROLOGY | Facility: CLINIC | Age: 74
End: 2024-10-11

## 2024-10-21 ENCOUNTER — OFFICE VISIT (OUTPATIENT)
Dept: FAMILY MEDICINE CLINIC | Facility: CLINIC | Age: 74
End: 2024-10-21
Payer: MEDICARE

## 2024-10-21 VITALS
WEIGHT: 147.19 LBS | RESPIRATION RATE: 18 BRPM | SYSTOLIC BLOOD PRESSURE: 138 MMHG | BODY MASS INDEX: 25 KG/M2 | TEMPERATURE: 98 F | OXYGEN SATURATION: 96 % | DIASTOLIC BLOOD PRESSURE: 84 MMHG | HEART RATE: 80 BPM

## 2024-10-21 DIAGNOSIS — Z23 NEED FOR VACCINATION: Primary | ICD-10-CM

## 2024-10-21 DIAGNOSIS — G30.9 ALZHEIMER'S DISEASE, UNSPECIFIED (HCC): ICD-10-CM

## 2024-10-21 DIAGNOSIS — E78.5 HYPERLIPIDEMIA, UNSPECIFIED HYPERLIPIDEMIA TYPE: ICD-10-CM

## 2024-10-21 DIAGNOSIS — F02.80 ALZHEIMER'S DISEASE, UNSPECIFIED (HCC): ICD-10-CM

## 2024-10-21 DIAGNOSIS — E11.65 UNCONTROLLED TYPE 2 DIABETES MELLITUS WITH HYPERGLYCEMIA (HCC): ICD-10-CM

## 2024-10-21 DIAGNOSIS — F10.20 ALCOHOLISM (HCC): ICD-10-CM

## 2024-10-21 DIAGNOSIS — I10 BENIGN ESSENTIAL HYPERTENSION: ICD-10-CM

## 2024-10-21 PROCEDURE — 1170F FXNL STATUS ASSESSED: CPT | Performed by: FAMILY MEDICINE

## 2024-10-21 PROCEDURE — G0008 ADMIN INFLUENZA VIRUS VAC: HCPCS | Performed by: FAMILY MEDICINE

## 2024-10-21 PROCEDURE — 1160F RVW MEDS BY RX/DR IN RCRD: CPT | Performed by: FAMILY MEDICINE

## 2024-10-21 PROCEDURE — 3075F SYST BP GE 130 - 139MM HG: CPT | Performed by: FAMILY MEDICINE

## 2024-10-21 PROCEDURE — 99214 OFFICE O/P EST MOD 30 MIN: CPT | Performed by: FAMILY MEDICINE

## 2024-10-21 PROCEDURE — 1159F MED LIST DOCD IN RCRD: CPT | Performed by: FAMILY MEDICINE

## 2024-10-21 PROCEDURE — 3079F DIAST BP 80-89 MM HG: CPT | Performed by: FAMILY MEDICINE

## 2024-10-21 PROCEDURE — 90662 IIV NO PRSV INCREASED AG IM: CPT | Performed by: FAMILY MEDICINE
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
## 2024-10-28 ENCOUNTER — TELEPHONE (OUTPATIENT)
Dept: FAMILY MEDICINE CLINIC | Facility: CLINIC | Age: 74
End: 2024-10-28

## 2024-10-28 NOTE — TELEPHONE ENCOUNTER
He has been an alcoholic ever since he came under my care a few years ago.  Has been stable lately.      He is being treated for a EtOH-associated dementia by Neurology, Vito Vázquez.  Per her recent OV note, she mentiones she d/w family into looking into [facility] with a memory care unit.  It doesn't explicitly say that he is unable to make his own decisions.      See her notes:  Patient is a 74-year-old male with EtOH dependence, chronic depression anxiety disorder who presented to establish care for dementia.  Suspecting dementia associated with alcoholism versus behavioral frontotemporal dementia.     Refer to [facility] for alcohol rehab and for behavior/mood management    Resume Donepezil 10 mg nightly  Start thiamine 100 mg daily supplements    Counseled to limit use of alcohol and to join alcohol rehab program  Discussed with the patient's family to look into nursing home or memory care center     Follow up in about 3-6 months          Thanks.

## 2024-10-28 NOTE — TELEPHONE ENCOUNTER
Rachael With [place] [employer] called the office asking to speak to  in regards to some questions that she has about patients capabilities.

## 2024-10-28 NOTE — TELEPHONE ENCOUNTER
Rachael with [employer] calling regarding a concern they received about Pyarali.     Pt has been observed eloping from his house frequently - often times going to the liquor store by foot.     Rachael would like to know if Dr. Jones can provide feedback on whether pt is still able to make his own decisions? She would also like to know how dependent he is on others?    Routed to Dr. Jones

## 2024-10-29 ENCOUNTER — TELEPHONE (OUTPATIENT)
Dept: NEUROLOGY | Facility: CLINIC | Age: 74
End: 2024-10-29

## 2024-10-29 ENCOUNTER — TELEPHONE (OUTPATIENT)
Dept: FAMILY MEDICINE CLINIC | Facility: CLINIC | Age: 74
End: 2024-10-29

## 2024-10-29 NOTE — TELEPHONE ENCOUNTER
Phone call received from Veroinca at [facility]. Veronica wishes to inquire on the providers LOV notes and recommendations for the pt. RN provided the recommendations that were given to the pt and his family at his LOV. Veronica thanks this RN. Message sent to provider for review.       LOV 09/24/24    ASSESSMENT/PLAN:           Encounter Diagnoses   Name Primary?    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  Suspecting dementia associated with alcoholism versus behavioral frontotemporal dementia.     Refer to [facility] for alcohol rehab and for behavior/mood management    Resume Donepezil 10 mg nightly  Start thiamine 100 mg daily supplements    Counseled to limit use of alcohol and to join alcohol rehab program  Discussed with the patient's family to look into nursing home or memory care center     Follow up in about 3-6 months

## 2024-10-29 NOTE — TELEPHONE ENCOUNTER
Patient's daughter called to see if Family Medical Leave Act paperwork has been received. Located forms in originals. Patient is requesting forms to be processed as STAT since forms were sent to provider's office on 10/11/24    Type of Leave: Family Medical Leave Act (patient's daughter)   Reason for Leave: Alzheimer/dementia  Start date of leave: 10/29/24  End date of leave: 10/28/25  How many flare ups per month/length?: 1-3 lasting per month 1 day   How many appts per month/length?: 4 per month lasting 2 hours   Was Fee and Turnaround info Given?: Yes

## 2024-10-29 NOTE — TELEPHONE ENCOUNTER
Dr. Jones    Please sign off on form if you agree to: Intermittent Family Medical Leave Act    Start: 10/29/24  End: 10/28/25    -Signature page will be the first page scanned  -From your Inbasket, Highlight the patient and click Chart   -Double click the 10/29/24 Forms Completion telephone encounter  -Scroll down to the Media section   -Click the blue Hyperlink: Family Medical Leave Act   Dr. Jones  10/29/24  -Click Acknowledge located in the top right ribbon/menu   -Drag the mouse into the blank space of the document and a + sign will appear. Left click to   electronically sign the document.  -Once signed, simply exit out of the screen and you signature will be saved.     Thank you,    Santiago DEVI

## 2024-10-29 NOTE — TELEPHONE ENCOUNTER
Darwin Maxwell,    Patient's daughter is requesting Family Medical Leave Act to care for the patient. Patient's daughter is requesting 1-3 flare ups per month lasting 1 day and 4 appointments per month with a start date for the leave 10/29/24 and end date of 10/28/25.    Do you support?    Thank you  Khadijah

## 2024-10-30 NOTE — TELEPHONE ENCOUNTER
Family Medical Leave Act forms (daughter) completed and signed by provider. No Release of Information - Uploaded to [employer]

## 2024-10-31 ENCOUNTER — TELEPHONE (OUTPATIENT)
Dept: NEUROLOGY | Facility: CLINIC | Age: 74
End: 2024-10-31

## 2024-10-31 NOTE — TELEPHONE ENCOUNTER
Returned PC to Svitlana. She decided to keep the patient's appointment on 11/26. She stated that the patient is still experiencing intermittent confusion such as mistaking bathroom  for mouthwash a few weeks ago and having difficulty choosing which bathroom to go into in public. She stated that the family is monitoring him closely and ensuring his safety. Encouraged her to call back in the meantime with any concerning or worsening symptoms. Will also notify Dr. Padron. Svitlana verbalized understanding.

## 2024-11-01 NOTE — TELEPHONE ENCOUNTER
Spoke with Svitlana. Confirmed that they brought the patient to [facility] for ETOH rehab, but they were unable to admit him to a program because he was in denial regarding his addiction, but recommended bringing him back when he becomes more aware of his potential problem.   Will notify Dr. Padron.    Also, per Dr. Padron, advised Svitlana to bring the patient to the ER for evaluation if his confusion worsens. She verbalized understanding. Encouraged her to call back with any other questions or concerns.

## 2024-11-04 ENCOUNTER — TELEPHONE (OUTPATIENT)
Dept: FAMILY MEDICINE CLINIC | Facility: CLINIC | Age: 74
End: 2024-11-04

## 2024-11-04 DIAGNOSIS — Z12.11 COLON CANCER SCREENING: Primary | ICD-10-CM

## 2024-11-04 NOTE — TELEPHONE ENCOUNTER
Patients daughter is calling trying see if they need to make an appointment with a Gi doctor for a colonoscopy.  Please call Svitlana at [phone]

## 2024-11-05 NOTE — TELEPHONE ENCOUNTER
Per his 6/2024 annual px OV note:    Last PSA: 6/2023, was normal (rpt in 2 yrs)  Last colonoscopy: never completed, Cologuard order placed     If he'd rather go for colonoscopy, I signed a referral today for that, too.    Thanks.

## 2024-11-05 NOTE — TELEPHONE ENCOUNTER
Please advise. Thank you.  Spoke to daughter Svitlana (ok per HIPAA)  Patient's name and  verified    Daughter inquiring about colonoscopy screening.   Doesn't  recall patient ever having one.  Reports patient does have a hard time going to the bathroom I regards to straining.   She is going to check with patient regarding any blood in stool.

## 2024-11-06 NOTE — TELEPHONE ENCOUNTER
Spoke to daughter Svitlana,  Patient's name and  verified  Would like to do cologuard.  Order faxed via [employer] to [phone]   Understanding was verbalized.

## 2024-11-26 ENCOUNTER — OFFICE VISIT (OUTPATIENT)
Dept: NEUROLOGY | Facility: CLINIC | Age: 74
End: 2024-11-26
Payer: MEDICARE

## 2024-11-26 VITALS
HEART RATE: 84 BPM | HEIGHT: 67 IN | OXYGEN SATURATION: 98 % | WEIGHT: 151 LBS | RESPIRATION RATE: 16 BRPM | SYSTOLIC BLOOD PRESSURE: 140 MMHG | DIASTOLIC BLOOD PRESSURE: 88 MMHG | BODY MASS INDEX: 23.7 KG/M2

## 2024-11-26 DIAGNOSIS — F10.27 DEMENTIA ASSOCIATED WITH ALCOHOLISM WITH BEHAVIORAL DISTURBANCE (HCC): ICD-10-CM

## 2024-11-26 DIAGNOSIS — F39 MOOD DISORDER (HCC): ICD-10-CM

## 2024-11-26 PROCEDURE — 3079F DIAST BP 80-89 MM HG: CPT | Performed by: OTHER

## 2024-11-26 PROCEDURE — 1159F MED LIST DOCD IN RCRD: CPT | Performed by: OTHER

## 2024-11-26 PROCEDURE — 99214 OFFICE O/P EST MOD 30 MIN: CPT | Performed by: OTHER

## 2024-11-26 PROCEDURE — 3008F BODY MASS INDEX DOCD: CPT | Performed by: OTHER

## 2024-11-26 PROCEDURE — 3077F SYST BP >= 140 MM HG: CPT | Performed by: OTHER

## 2024-11-26 NOTE — PROGRESS NOTES
Patient is here with his two daughters.   Patient daughter stated she thinks his condition is getting worse.

## 2024-11-26 NOTE — PATIENT INSTRUCTIONS
Refill policies:    Allow 2-3 business days for refills; controlled substances may take longer.  Contact your pharmacy at least 5 days prior to running out of medication and have them send an electronic request or submit request through the “request refill” option in your [facility] account.  Refills are not addressed on weekends; covering physicians do not authorize routine medications on weekends.  No narcotics or controlled substances are refilled after noon on Fridays or by on call physicians.  By law, narcotics must be electronically prescribed.  A 30 day supply with no refills is the maximum allowed.  If your prescription is due for a refill, you may be due for a follow up appointment.  To best provide you care, patients receiving routine medications need to be seen at least once a year.  Patients receiving narcotic/controlled substance medications need to be seen at least once every 3 months.  In the event that your preferred pharmacy does not have the requested medication in stock (e.g. Backordered), it is your responsibility to find another pharmacy that has the requested medication available.  We will gladly send a new prescription to that pharmacy at your request.    Scheduling Tests:    If your physician has ordered radiology tests such as MRI or CT scans, please contact Central Scheduling at [phone] right away to schedule the test.  Once scheduled, the [facility] Centralized Referral Team will work with your insurance carrier to obtain pre-certification or prior authorization.  Depending on your insurance carrier, approval may take 3-10 days.  It is highly recommended patients assure they have received an authorization before having a test performed.  If test is done without insurance authorization, patient may be responsible for the entire amount billed.      Precertification and Prior Authorizations:  If your physician has recommended that you have a procedure or additional testing performed the [facility]  Centralized Referral Team will contact your insurance carrier to obtain pre-certification or prior authorization.    You are strongly encouraged to contact your insurance carrier to verify that your procedure/test has been approved and is a COVERED benefit.  Although the [facility] Centralized Referral Team does its due diligence, the insurance carrier gives the disclaimer that \"Although the procedure is authorized, this does not guarantee payment.\"    Ultimately the patient is responsible for payment.   Thank you for your understanding in this matter.  Paperwork Completion:  If you require FMLA or disability paperwork for your recovery, please make sure to either drop it off or have it faxed to our office at [phone]. Be sure the form has your name and date of birth on it.  The form will be faxed to our Forms Department and they will complete it for you.  There is a 25$ fee for all forms that need to be filled out.  Please be aware there is a 10-14 day turnaround time.  You will need to sign a release of information (VITALIY) form if your paperwork does not come with one.  You may call the Forms Department with any questions at [phone].  Their fax number is [phone].

## 2024-11-27 ENCOUNTER — TELEPHONE (OUTPATIENT)
Age: 74
End: 2024-11-27

## 2024-11-27 NOTE — PROGRESS NOTES
HPI:    Patient ID: Rodrigo Mueller is a 74 year old male.    Dementia      Mr Mueller is a 74 year old male who presents for follow up for Dementia associated alcoholism along with his daughters. Family reports they went to [facility] for possible alcohol rehab but patient declined. They didn't receive any Psychiatric evaluation. He continues to have mood aggression and behavioral disturbance. States that he has a high spiritual connection with God and people don't understand this. Family states he gets agitated easily, talk excessively, tangential speech.         Office visit: Sept 2024  Patient is a 74-year-old male with history of Dementia presented to [facility].  He is here along with his wife and daughter reports that his condition has been declining.  Wife reports he was following with Neurology but lost follow-up.   He continues to drink alcohol, goes to the store and buys himself. He gets agitated when ask to stop drinking, argues and fights with his wife all the time. States he got intoxicated many times, neighbors objects his behavior and she had to called police at home.   He denies alcohol rehab, does not follow with behavioral Health. Had seen them in the past.  He is not taking Donepezil consistently.  Patient reports he is fine, just came back from [place] and able to manage things for himself.  He jumps from one topic to another, confabulates at times and lacks insight about his situation.       Initial history: May 2022    Rodrigo Mueller is a(n) 71 year old, right handed,  male who presents for poor short term memory for awhile, at least last 5-6 years.  he came in with his wife today. He reported  he forgets keys or wallet, he could not remember where he put right after, daughter said he frequently repeat same conversation, he used to work as , he was fired from work, he reported his work has been race-discriminating him, he is filing report for it, after off work, he was depressed,  he has been drinking a lot last two to three years, more so after fired from work, he is agitated, abusive at home to wife, family sent him to [facility], he was seen by a psychologist and psychiatrist then,   He was sent  to [facility] in May, he was abusing  ETOH , he has a lot of stress, he is heavily drinking for two years, he was aggressive to his wife, he was in [facility] for a month,  After that, he did not continue any of medications, he feels his memory is better too, but daughter does not think so, he has seen by primary.  MRI of brain was done, he is referred here. He denies weakness, numbness, no physical limitation, no incontinence. Sleeps fine. He is anxious, no ongoing depression, sometime aggressive to his family members.he was last seen in my office in 11/2017 first time, when he had started poor short term memory,   He lost job right after last seen in 2017, he has  no insurance, so he lost follow up, he got medicare at age of 67, he remains poor memory, not too bad, but it is getting worse, he feels his balance is poor, he fell few times three years ago, no bleeding, a month ago, he drunk fell again, he did not want to check in hospital, he remains to drink heavily, he and wife fights a lot, wife took over his car key, He has poor balance and  tingling sensation in his feet, his balance is off, he repeated same things again and again, he was last seen on 12/17/2021 due to poor memory and poor balance, he had EEG and EMG, labs done, he was given Aricept 5 mg at bedtime, he feels it helped him, he tolerated it well, he came in with his daughter today,        HISTORY:  Past Medical History:    Diabetes (HCC)    Diabetes mellitus (HCC)    Hyperlipidemia    Hypertension      History reviewed. No pertinent surgical history.   Family History   Problem Relation Age of Onset    Cancer Father         bone marrow CA    Diabetes Sister     Heart Disorder Brother     Hypertension Brother     Heart Disorder Brother       Social  History     Socioeconomic History    Marital status:     Number of children: 3   Occupational History    Occupation:    Tobacco Use    Smoking status: Never     Passive exposure: Never    Smokeless tobacco: Never   Vaping Use    Vaping status: Never Used   Substance and Sexual Activity    Alcohol use: Yes     Comment: Patient states he has one beer a few days per week. His daughter states that he actually has up to 10 beers and has been blacking out. Patient states last drink was 3 days ago. BAL is 0.    Drug use: No   Other Topics Concern    Caffeine Concern Yes     Comment: 1-2 cups coffee daily    Exercise Yes     Comment: Walking     Social Drivers of Health      [facility]    Housing Stability        Review of Systems   Constitutional: Negative.    HENT: Negative.     Eyes: Negative.    Respiratory: Negative.     Cardiovascular: Negative.    Gastrointestinal: Negative.    Endocrine: Negative.    Genitourinary: Negative.    Musculoskeletal: Negative.    Allergic/Immunologic: Negative.    Neurological: Negative.    Hematological: Negative.    Psychiatric/Behavioral:  Positive for behavioral problems.    All other systems reviewed and are negative.           Current Outpatient Medications   Medication Sig Dispense Refill    thiamine 100 MG Oral Tab Take 1 tablet (100 mg total) by mouth daily. 30 tablet 2    Empagliflozin (JARDIANCE) 25 MG Oral Tab Take 1 tablet by mouth daily. 90 tablet 1    donepezil 10 MG Oral Tab Take 1 tablet (10 mg total) by mouth nightly. 90 tablet 1    amLODIPine Besy-Benazepril HCl 10-40 MG Oral Cap Take 1 capsule by mouth daily. 30 capsule 0    atorvastatin 10 MG Oral Tab Take 1 tablet (10 mg total) by mouth nightly. 30 tablet 0    glimepiride 4 MG Oral Tab Take 1 tablet (4 mg total) by mouth daily with breakfast. 30 tablet 0    metFORMIN HCl 1000 MG Oral Tab Take 1 tablet (1,000 mg total) by mouth 2 (two)  times daily with meals. 60 tablet 0    Glucose Blood (ONETOUCH ULTRA) In Vitro Strip Check blood sugar once daily 100 each 3    OneTouch Delica Lancets 33G Does not apply Misc Use to check blood sugar  each 3    Thiamine HCl (VITAMIN B-1 OR) Take by mouth.      Blood Glucose Monitoring Suppl (ONETOUCH ULTRA 2) W/DEVICE Does not apply Kit Use as directed 1 kit 0     Allergies:No Known Allergies  PHYSICAL EXAM:   Physical Exam      General Appearance: Well nourished, well developed, no apparent distress.   HEENT: Normocephalic and atraumatic. Normal sclera.   Cardiovascular: Normal rate, regular rhythm and normal heart sounds.    Pulmonary/Chest: Effort normal and breath sounds normal.   Abdominal: Soft. Bowel sounds are normal.   Psych: normal mood, anxious affect, circumferential thought process     Neurological: Patient is awake, alert and oriented to person and place  Speech is fluent. Able to follow simple commands  MOCA- unable to complete, patient not co-operative    Cranial Nerves:   II: Visual acuity: normal  III: Pupils: equal, round, reactive to light  III,IV,VI: Extra Ocular Movements: intact  V: Facial sensation: intact  VII: Facial strength: intact  VIII: Hearing: intact  IX: Palate: intact  XI: Shoulder shrug: intact  XII: Tongue movement: normal    Motor: No tremors. Strength is  5 out of 5 in all extremities bilaterally.    Sensory: Sensory examination is normal to light touch and pinprick     Coordination: Finger-to-nose normal bilaterally without evidence of dysmetria.    Gait: normal casual gait     TESTS/IMAGING:     MRI brain outside facility November 2017        ASSESSMENT/PLAN:     Encounter Diagnoses   Name Primary?    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  Mood aggression and possible psychosis    He was refer to  [facility] for alcohol rehab and  for behavior/mood management however didn't got any Psychiatric evaluation  Referral placed again, prefer seeing Geriatric Psychiatry    Repeat MRI brain to assess for any interval changes    Continue Donepezil 10 mg nightly  Continue Thiamine 100 mg daily supplements    Counseled on cognitive exercises and refrain from alcohol use  Discussed with the patient's family to look into nursing home or memory care center    Follow up in about 3-6 months        No orders of the defined types were placed in this encounter.      Thank you for allowing us to participate in your patient's care.      Maya Padron MD  [facility] [place]      Meds This Visit:  Requested Prescriptions      No prescriptions requested or ordered in this encounter       Imaging & Referrals:  [facility] - INTERNAL  MRI BRAIN (CPT=70551)     ID#[id number]

## 2024-12-02 ENCOUNTER — TELEPHONE (OUTPATIENT)
Age: 74
End: 2024-12-02

## 2024-12-02 NOTE — TELEPHONE ENCOUNTER
Hello,     Thank you for speaking with me last week. In discussing this case and looking at best options that are within network I have pulled the following providers. Please call the providers directly to schedule. If distance is a concern please inquire on virtual options.       [facility] (This location offers Therapy and Psychiatry)  Dr. Russel Saxena- [facility], specializes in Geriatric care  [address]   [address] [address]  Phone: [phone]  [email or web address]      [facility] (This location offers Therapy and Psychiatry)  Dr. Eleuterio Pugh  [address]  [address]  [place] [address]  Phone: [phone]  [email or web address]       [facility]  (This location offers Therapy and Psychiatry)  Dr. Grey Kahn   [address], [address]  Phone: [phone]  [email or web address]      Warm regards,     Deborah Boyd LCPC  (she/her/hers)  Lead Patient Care Navigator Mental Health   [facility]/Mental Health Division    [email or web address]  [phone]  work  [facility], later [facility], [facility], [facility], [place] [address]  [email or web address]  Request an assessment or support »

## 2024-12-30 ENCOUNTER — OFFICE VISIT (OUTPATIENT)
Dept: FAMILY MEDICINE CLINIC | Facility: CLINIC | Age: 74
End: 2024-12-30
Payer: MEDICARE

## 2024-12-30 ENCOUNTER — HOSPITAL ENCOUNTER (OUTPATIENT)
Dept: MRI IMAGING | Facility: HOSPITAL | Age: 74
Discharge: HOME | End: 2024-12-30
Attending: Other
Payer: MEDICARE

## 2024-12-30 VITALS
HEIGHT: 67 IN | RESPIRATION RATE: 18 BRPM | WEIGHT: 152.19 LBS | HEART RATE: 103 BPM | DIASTOLIC BLOOD PRESSURE: 98 MMHG | OXYGEN SATURATION: 96 % | BODY MASS INDEX: 23.89 KG/M2 | SYSTOLIC BLOOD PRESSURE: 152 MMHG | TEMPERATURE: 99 F

## 2024-12-30 DIAGNOSIS — F10.20 ALCOHOLISM (HCC): ICD-10-CM

## 2024-12-30 DIAGNOSIS — I10 BENIGN ESSENTIAL HYPERTENSION: ICD-10-CM

## 2024-12-30 DIAGNOSIS — G30.9 ALZHEIMER'S DISEASE, UNSPECIFIED (HCC): ICD-10-CM

## 2024-12-30 DIAGNOSIS — E78.5 HYPERLIPIDEMIA, UNSPECIFIED HYPERLIPIDEMIA TYPE: ICD-10-CM

## 2024-12-30 DIAGNOSIS — F02.80 ALZHEIMER'S DISEASE, UNSPECIFIED (HCC): ICD-10-CM

## 2024-12-30 DIAGNOSIS — F39 MOOD DISORDER (HCC): ICD-10-CM

## 2024-12-30 DIAGNOSIS — F10.27 DEMENTIA ASSOCIATED WITH ALCOHOLISM WITH BEHAVIORAL DISTURBANCE (HCC): ICD-10-CM

## 2024-12-30 DIAGNOSIS — E11.65 UNCONTROLLED TYPE 2 DIABETES MELLITUS WITH HYPERGLYCEMIA (HCC): Primary | ICD-10-CM

## 2024-12-30 PROCEDURE — 99214 OFFICE O/P EST MOD 30 MIN: CPT | Performed by: FAMILY MEDICINE

## 2024-12-30 PROCEDURE — 1126F AMNT PAIN NOTED NONE PRSNT: CPT | Performed by: FAMILY MEDICINE

## 2024-12-30 PROCEDURE — 3080F DIAST BP >= 90 MM HG: CPT | Performed by: FAMILY MEDICINE

## 2024-12-30 PROCEDURE — 1160F RVW MEDS BY RX/DR IN RCRD: CPT | Performed by: FAMILY MEDICINE

## 2024-12-30 PROCEDURE — 70551 MRI BRAIN STEM W/O DYE: CPT | Performed by: OTHER

## 2024-12-30 PROCEDURE — 1159F MED LIST DOCD IN RCRD: CPT | Performed by: FAMILY MEDICINE

## 2024-12-30 PROCEDURE — G2211 COMPLEX E/M VISIT ADD ON: HCPCS | Performed by: FAMILY MEDICINE

## 2024-12-30 PROCEDURE — 3008F BODY MASS INDEX DOCD: CPT | Performed by: FAMILY MEDICINE

## 2024-12-30 PROCEDURE — 1170F FXNL STATUS ASSESSED: CPT | Performed by: FAMILY MEDICINE

## 2024-12-30 PROCEDURE — 3077F SYST BP >= 140 MM HG: CPT | Performed by: FAMILY MEDICINE

## 2024-12-30 RX ORDER — ATORVASTATIN CALCIUM 10 MG/1
10 TABLET, FILM COATED ORAL NIGHTLY
Qty: 90 TABLET | Refills: 1 | Status: SHIPPED | OUTPATIENT
Start: 2024-12-30

## 2024-12-30 RX ORDER — MELATONIN
100 DAILY
Qty: 30 TABLET | Refills: 2 | Status: SHIPPED | OUTPATIENT
Start: 2024-12-30

## 2024-12-30 RX ORDER — DONEPEZIL HYDROCHLORIDE 10 MG/1
10 TABLET, FILM COATED ORAL NIGHTLY
Qty: 90 TABLET | Refills: 1 | Status: SHIPPED | OUTPATIENT
Start: 2024-12-30

## 2024-12-30 RX ORDER — AMLODIPINE AND BENAZEPRIL HYDROCHLORIDE 10; 40 MG/1; MG/1
1 CAPSULE ORAL DAILY
Qty: 90 CAPSULE | Refills: 1 | Status: SHIPPED | OUTPATIENT
Start: 2024-12-30

## 2024-12-30 RX ORDER — GLIMEPIRIDE 4 MG/1
4 TABLET ORAL
Qty: 90 TABLET | Refills: 1 | Status: SHIPPED | OUTPATIENT
Start: 2024-12-30
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
## 2025-02-13 ENCOUNTER — PATIENT OUTREACH (OUTPATIENT)
Dept: FAMILY MEDICINE CLINIC | Facility: CLINIC | Age: 75
End: 2025-02-13

## 2025-03-11 ENCOUNTER — TELEPHONE (OUTPATIENT)
Dept: FAMILY MEDICINE CLINIC | Facility: CLINIC | Age: 75
End: 2025-03-11

## 2025-03-11 NOTE — TELEPHONE ENCOUNTER
Unable to reach patient for medication adherence consult. LVM for patient to call me back at [phone].

## 2025-03-19 ENCOUNTER — TELEPHONE (OUTPATIENT)
Dept: FAMILY MEDICINE CLINIC | Facility: CLINIC | Age: 75
End: 2025-03-19

## 2025-04-28 ENCOUNTER — OFFICE VISIT (OUTPATIENT)
Dept: FAMILY MEDICINE CLINIC | Facility: CLINIC | Age: 75
End: 2025-04-28
Payer: MEDICARE

## 2025-04-28 VITALS
RESPIRATION RATE: 18 BRPM | HEIGHT: 67 IN | TEMPERATURE: 98 F | OXYGEN SATURATION: 96 % | DIASTOLIC BLOOD PRESSURE: 96 MMHG | BODY MASS INDEX: 22.94 KG/M2 | HEART RATE: 94 BPM | SYSTOLIC BLOOD PRESSURE: 150 MMHG | WEIGHT: 146.19 LBS

## 2025-04-28 DIAGNOSIS — Z12.5 PROSTATE CANCER SCREENING: ICD-10-CM

## 2025-04-28 DIAGNOSIS — F02.80 ALZHEIMER'S DISEASE, UNSPECIFIED (HCC): ICD-10-CM

## 2025-04-28 DIAGNOSIS — Z12.11 COLON CANCER SCREENING: ICD-10-CM

## 2025-04-28 DIAGNOSIS — F10.20 ALCOHOLISM (HCC): ICD-10-CM

## 2025-04-28 DIAGNOSIS — I10 BENIGN ESSENTIAL HYPERTENSION: ICD-10-CM

## 2025-04-28 DIAGNOSIS — G30.9 ALZHEIMER'S DISEASE, UNSPECIFIED (HCC): ICD-10-CM

## 2025-04-28 DIAGNOSIS — E78.5 HYPERLIPIDEMIA, UNSPECIFIED HYPERLIPIDEMIA TYPE: ICD-10-CM

## 2025-04-28 DIAGNOSIS — Z00.00 ANNUAL PHYSICAL EXAM: Primary | ICD-10-CM

## 2025-04-28 DIAGNOSIS — E11.65 UNCONTROLLED TYPE 2 DIABETES MELLITUS WITH HYPERGLYCEMIA (HCC): ICD-10-CM

## 2025-04-28 LAB
CREAT UR-SCNC: 78.9 MG/DL
MICROALBUMIN UR-MCNC: 10.1 MG/DL
MICROALBUMIN/CREAT 24H UR-RTO: 128 UG/MG (ref ?–30)

## 2025-04-28 PROCEDURE — 82570 ASSAY OF URINE CREATININE: CPT | Performed by: FAMILY MEDICINE

## 2025-04-28 PROCEDURE — 82043 UR ALBUMIN QUANTITATIVE: CPT | Performed by: FAMILY MEDICINE

## 2025-05-05 ENCOUNTER — PATIENT MESSAGE (OUTPATIENT)
Dept: FAMILY MEDICINE CLINIC | Facility: CLINIC | Age: 75
End: 2025-05-05

## 2025-05-05 DIAGNOSIS — E11.65 UNCONTROLLED TYPE 2 DIABETES MELLITUS WITH HYPERGLYCEMIA (HCC): Primary | ICD-10-CM

## 2025-05-05 NOTE — TELEPHONE ENCOUNTER
LOV 04/28/25    Office notes are still open-     Pt. is inquiring about a podiatry referral in regards to the foot fungus discussed at the office visit.    Pt. states he already schedule an appt. with  (Podiatry) for tomorrow 05/06/25. Advised pt. to cancel the appt. and re-schedule once referral is placed and processed to avoid any issues with insurance.    Referral pended for Dr. Jackson
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
## 2025-05-06 NOTE — TELEPHONE ENCOUNTER
Referral signed fro Dx: DM2.  We did not discuss foot fungus at his most recent appt, but he can still see Podiatry for routine diabetic foot care, and can ask about the foot fungus while he is there.    Thanks.

## 2025-05-14 ENCOUNTER — OFFICE VISIT (OUTPATIENT)
Dept: PODIATRY CLINIC | Facility: CLINIC | Age: 75
End: 2025-05-14
Payer: MEDICARE

## 2025-05-14 ENCOUNTER — OFFICE VISIT (OUTPATIENT)
Dept: FAMILY MEDICINE CLINIC | Facility: CLINIC | Age: 75
End: 2025-05-14
Payer: MEDICARE

## 2025-05-14 VITALS
BODY MASS INDEX: 22.94 KG/M2 | HEART RATE: 97 BPM | RESPIRATION RATE: 18 BRPM | DIASTOLIC BLOOD PRESSURE: 76 MMHG | HEIGHT: 67 IN | TEMPERATURE: 98 F | WEIGHT: 146.19 LBS | OXYGEN SATURATION: 99 % | SYSTOLIC BLOOD PRESSURE: 140 MMHG

## 2025-05-14 DIAGNOSIS — F32.9 CURRENT EPISODE OF MAJOR DEPRESSIVE DISORDER WITHOUT PRIOR EPISODE, UNSPECIFIED DEPRESSION EPISODE SEVERITY: ICD-10-CM

## 2025-05-14 DIAGNOSIS — G30.9 ALZHEIMER'S DISEASE, UNSPECIFIED (HCC): ICD-10-CM

## 2025-05-14 DIAGNOSIS — E11.9 TYPE 2 DIABETES MELLITUS WITHOUT COMPLICATION, WITHOUT LONG-TERM CURRENT USE OF INSULIN (HCC): Primary | ICD-10-CM

## 2025-05-14 DIAGNOSIS — B35.1 ONYCHOMYCOSIS: ICD-10-CM

## 2025-05-14 DIAGNOSIS — R21 RASH: Primary | ICD-10-CM

## 2025-05-14 DIAGNOSIS — M20.11 VALGUS DEFORMITY OF BOTH GREAT TOES: ICD-10-CM

## 2025-05-14 DIAGNOSIS — F02.80 ALZHEIMER'S DISEASE, UNSPECIFIED (HCC): ICD-10-CM

## 2025-05-14 DIAGNOSIS — M20.12 VALGUS DEFORMITY OF BOTH GREAT TOES: ICD-10-CM

## 2025-05-14 PROCEDURE — 99213 OFFICE O/P EST LOW 20 MIN: CPT | Performed by: PODIATRIST

## 2025-05-14 PROCEDURE — 1159F MED LIST DOCD IN RCRD: CPT | Performed by: PODIATRIST

## 2025-05-14 PROCEDURE — 99214 OFFICE O/P EST MOD 30 MIN: CPT | Performed by: FAMILY MEDICINE

## 2025-05-14 RX ORDER — NYSTATIN AND TRIAMCINOLONE ACETONIDE 100000; 1 [USP'U]/G; MG/G
1 CREAM TOPICAL 2 TIMES DAILY
Qty: 60 G | Refills: 0 | Status: SHIPPED | OUTPATIENT
Start: 2025-05-14

## 2025-05-14 RX ORDER — HYDROCHLOROTHIAZIDE 25 MG/1
TABLET ORAL
COMMUNITY

## 2025-05-14 NOTE — PROGRESS NOTES
Rodrigo Mueller is a 74 year old male.   Chief Complaint   Patient presents with    Fungus Nails     Nail fungus right foot         HPI:   This patient presents to the clinic with a chief complaint of nail fungus on his right foot he is also diabetic and his daughter is present with him.  He does suffer from Alzheimer's disease and dementia.  At today's visit reviewed nurse's history as taken above, allergies medications and medical history as documented below.  All changes duly noted  Allergies: Patient has no known allergies.   Current Medications[1]   Past Medical History[2]   Past Surgical History[3]   Family History[4]   Social Hx on file[5]        REVIEW OF SYSTEMS:   Today reviewed systens as documented below  GENERAL HEALTH: feels well otherwise  SKIN: Refer to exam below  RESPIRATORY: denies shortness of breath with exertion  CARDIOVASCULAR: denies chest pain on exertion  GI: denies abdominal pain and denies heartburn  NEURO: denies headaches    EXAM:   There were no vitals taken for this visit.  GENERAL: well developed, well nourished, in no apparent distress  EXTREMITIES:   1. Integument: The skin on his feet is warm and dry.  His 2nd and 3rd toenails on the right foot are dark discolored with subungual debris subungual keratosis and distal lysis from the nailbed does nails were clipped and specimens were taken for fungus culture.  He has an enlarged medial eminence of the first metatarsal head consistent with a hallux valgus deformity.   2. Vascular: Patient has palpable pulses dorsalis pedis posterior tibial bilateral they are symmetrical and equivocal with immediate cap return the pedal digits   3. Neurologic: Patient has intact sensorium he can feel Moline-Hermes 10 g filament in all dermatomes   4. Musculoskeletal: Patient has hallux valgus deformity mildly track bound no arthritic changes palpated the first MTP joint on both feet.    ASSESSMENT AND PLAN:   Diagnoses and all orders for this  visit:    Type 2 diabetes mellitus without complication, without long-term current use of insulin (HCC)    Alzheimer's disease, unspecified (HCC)    Valgus deformity of both great toes    Onychomycosis  -     Fungus Hair, Skin, Nail Culture (Dermatophyte); Future        Plan: Today discussed different treatments for the fungal toenails including topical versus oral I usually recommend oral because he only takes 1 pill a day for 3 months it is easier to comply with and has a better success rate also discussed topicals doing nothing at all and routine trimming.  Today using a nail nippers were trimmed and debrided toenails 1-5 manually and mechanically in girth and width as far down to healthy tissue as possible on both feet.  This was done uneventfully there was no hemorrhage.  There is mild incurvation of the medial and lateral nail borders of the hallux which using a slant back technique were removed and they would not get ingrown.   At today's visit I reminded the patient about daily foot checks  Reminded patient again of proper control of his diabetes to help prevent any severe complications in his feet  Proper foot hygiene moisturizing except between the toes was reviewed  Advised follow-up again every 2 to 3 months for routine care but emphasized to him the importance of coming in sooner if he notices any problems.    The patient indicates understanding of these issues and agrees to the plan.    Samir Jackson DPM       [1]   Current Outpatient Medications   Medication Sig Dispense Refill    hydroCHLOROthiazide 25 MG Oral Tab take 1 tablet by mouth in the morning Orally Once a day for 30      metFORMIN HCl 1000 MG Oral Tab Take 1 tablet (1,000 mg total) by mouth 2 (two) times daily with meals. 180 tablet 1    thiamine 100 MG Oral Tab Take 1 tablet (100 mg total) by mouth daily. 30 tablet 2    glimepiride 4 MG Oral Tab Take 1 tablet (4 mg total) by mouth daily with breakfast. 90 tablet 1    empagliflozin  (JARDIANCE) 25 MG Oral Tab Take 1 tablet (25 mg total) by mouth daily. 90 tablet 1    donepezil 10 MG Oral Tab Take 1 tablet (10 mg total) by mouth nightly. 90 tablet 1    atorvastatin 10 MG Oral Tab Take 1 tablet (10 mg total) by mouth nightly. 90 tablet 1    amLODIPine Besy-Benazepril HCl 10-40 MG Oral Cap Take 1 capsule by mouth daily. 90 capsule 1    Glucose Blood (ONETOUCH ULTRA) In Vitro Strip Check blood sugar once daily 100 each 3    OneTouch Delica Lancets 33G Does not apply Misc Use to check blood sugar  each 3    Thiamine HCl (VITAMIN B-1 OR) Take by mouth.      Blood Glucose Monitoring Suppl (ONETOUCH ULTRA 2) W/DEVICE Does not apply Kit Use as directed 1 kit 0   [2]   Past Medical History:   Diabetes (HCC)    Diabetes mellitus (HCC)    Hyperlipidemia    Hypertension   [3] History reviewed. No pertinent surgical history.  [4]   Family History  Problem Relation Age of Onset    Cancer Father         bone marrow CA    Diabetes Sister     Heart Disorder Brother     Hypertension Brother     Heart Disorder Brother    [5]   Social History  Socioeconomic History    Marital status:     Number of children: 3   Occupational History    Occupation: [employer]   Tobacco Use    Smoking status: Never     Passive exposure: Never    Smokeless tobacco: Never   Vaping Use    Vaping status: Never Used   Substance and Sexual Activity    Alcohol use: Yes     Comment: Patient states he has one beer a few days per week. His daughter states that he actually has up to 10 beers and has been blacking out. Patient states last drink was 3 days ago. BAL is 0.    Drug use: No   Other Topics Concern    Caffeine Concern Yes     Comment: 1-2 cups coffee daily    Exercise Yes     Comment: Walking

## 2025-05-15 ENCOUNTER — PATIENT MESSAGE (OUTPATIENT)
Dept: NEUROLOGY | Facility: CLINIC | Age: 75
End: 2025-05-15

## 2025-05-15 NOTE — PROGRESS NOTES
CHIEF COMPLAINT:   Chief Complaint   Patient presents with    Itchiness     Bumps back of  neck and arm     Cough         HPI:     Rodrigo Mueller is a 74 year old male presents for rash.    Rash\" pt has a rash on his lower scalp, posterior neck. Is not itchy or painful.  He has not applied any medication to it.  He has no new shampoo, soap, or hair productts.  He feels a pimple on his scalp just above the rash.  He uses a baby shampoo to wash his hair. He thinks he has had this rash for a few weeks.                 HISTORY:  Past Medical History[1]   Past Surgical History[2]   Family History[3]   Social History: Short Social Hx on File[4]     Medications (Active prior to today's visit):  Current Medications[5]    Allergies:  Allergies[6]    PSFH elements reviewed from today and agreed except as otherwise stated in HPI.  ROS:     Review of Systems   Constitutional:  Negative for appetite change, chills and fever.   Skin:  Positive for color change and rash.         Pertinent positives and negatives noted in the the HPI.    PHYSICAL EXAM:   /76 (BP Location: Left arm, Patient Position: Sitting, Cuff Size: adult)   Pulse 97   Temp 98.4 °F (36.9 °C) (Temporal)   Resp 18   Ht 5' 7\" (1.702 m)   Wt 146 lb 3.2 oz (66.3 kg)   SpO2 99%   BMI 22.90 kg/m²   Vital signs reviewed.Appears stated age, well groomed.  Physical Exam  Vitals reviewed.   Constitutional:       General: He is not in acute distress.     Appearance: Normal appearance. He is not ill-appearing.   HENT:      Head: Normocephalic.      Right Ear: Tympanic membrane and ear canal normal.      Left Ear: Tympanic membrane and ear canal normal.      Mouth/Throat:      Mouth: Mucous membranes are moist.      Pharynx: Oropharynx is clear. No oropharyngeal exudate.   Eyes:      Conjunctiva/sclera: Conjunctivae normal.   Cardiovascular:      Rate and Rhythm: Normal rate and regular rhythm.   Pulmonary:      Effort: Pulmonary effort is normal. No respiratory  distress.      Breath sounds: Normal breath sounds.   Musculoskeletal:      Cervical back: Normal range of motion.   Lymphadenopathy:      Cervical: No cervical adenopathy.   Skin:     General: Skin is warm and dry.      Findings: Erythema and rash present.      Comments: Posterior neck: macule of slight erythema,  has one pustular lesion in hairline, nontender.  No open wound.   Neurological:      Mental Status: He is alert and oriented to person, place, and time.   Psychiatric:         Behavior: Behavior normal.          LABS     Office Visit on 04/28/2025   Component Date Value    Microalbumin, Urine 04/28/2025 10.10     Creatinine Ur Random 04/28/2025 78.90     Malb/Cre Calc 04/28/2025 128.0 (H)       REVIEWED THIS VISIT  ASSESSMENT/PLAN:   74 year old male with    1. Rash  - START Nystatin triamcinolone cream BID, R/B/SE of new med d/w pt  - if sx worsen  or persist, advised to f-u    - nystatin-triamcinolone 100,000-0.1 Units/g-% External Cream; Apply 1 Application topically 2 (two) times daily.  Dispense: 60 g; Refill: 0    2. Alzheimer's disease, unspecified (HCC)  - pt dtr requesting info on IOP or PHP programs for depression and Alzheimer's dementia  - referral for  Navigator to assist pt and family  -  NAVIGATOR    3. Current episode of major depressive disorder without prior episode, unspecified depression episode severity  See above.    -  NAVIGATOR     The patient and provider have a longitudinal relationship to address/treat the serious or   complex condition(s) as stated in this encounter.       Meds This Visit:  Requested Prescriptions     Signed Prescriptions Disp Refills    nystatin-triamcinolone 100,000-0.1 Units/g-% External Cream 60 g 0     Sig: Apply 1 Application topically 2 (two) times daily.       Health Maintenance:  Health Maintenance   Topic Date Due    Colorectal Cancer Screening  01/12/2022    COVID-19 Vaccine (5 - 2024-25 season) 09/01/2024    Diabetes Care A1C  03/03/2025     HTN: BP Follow-Up  06/14/2025    PSA  06/22/2025    Diabetes Care: GFR  12/03/2025    Diabetes Care Dilated Eye Exam  02/26/2026    Diabetes Care Foot Exam  04/28/2026    Influenza Vaccine  Completed    Annual Well Visit  Completed    Annual Depression Screening  Completed    Fall Risk Screening (Annual)  Completed    Diabetes Care: Microalb/Creat Ratio (Annual)  Completed    Pneumococcal Vaccine: 50+ Years  Completed    Zoster Vaccines  Completed    Meningococcal B Vaccine  Aged Out         Patient/Caregiver Education: There are no barriers to learning. Medical education done.   Outcome: Patient verbalizes understanding and agrees with plan. Advised to call or RTC if symptoms persist or worsen.    Problem List:   Problem List[7]    Imaging & Referrals:   NAVIGATOR     5/15/2025  Marianne Jones MD      Patient understands plan and follow-up.  Return if symptoms worsen or fail to improve.              [1]   Past Medical History:   Diabetes (HCC)    Diabetes mellitus (HCC)    Hyperlipidemia    Hypertension   [2] No past surgical history on file.  [3]   Family History  Problem Relation Age of Onset    Cancer Father         bone marrow CA    Diabetes Sister     Heart Disorder Brother     Hypertension Brother     Heart Disorder Brother    [4]   Social History  Socioeconomic History    Marital status:     Number of children: 3   Occupational History    Occupation:    Tobacco Use    Smoking status: Never     Passive exposure: Never    Smokeless tobacco: Never   Vaping Use    Vaping status: Never Used   Substance and Sexual Activity    Alcohol use: Yes     Comment: Patient states he has one beer a few days per week. His daughter states that he actually has up to 10 beers and has been blacking out. Patient states last drink was 3 days ago. BAL is 0.    Drug use: No   Other Topics Concern    Caffeine Concern Yes     Comment: 1-2 cups coffee daily    Exercise Yes     Comment: Walking     Social  Drivers of Health      [facility]    Housing Stability   [5]   Current Outpatient Medications   Medication Sig Dispense Refill    hydroCHLOROthiazide 25 MG Oral Tab take 1 tablet by mouth in the morning Orally Once a day for 30      nystatin-triamcinolone 100,000-0.1 Units/g-% External Cream Apply 1 Application topically 2 (two) times daily. 60 g 0    metFORMIN HCl 1000 MG Oral Tab Take 1 tablet (1,000 mg total) by mouth 2 (two) times daily with meals. 180 tablet 1    thiamine 100 MG Oral Tab Take 1 tablet (100 mg total) by mouth daily. 30 tablet 2    glimepiride 4 MG Oral Tab Take 1 tablet (4 mg total) by mouth daily with breakfast. 90 tablet 1    empagliflozin (JARDIANCE) 25 MG Oral Tab Take 1 tablet (25 mg total) by mouth daily. 90 tablet 1    donepezil 10 MG Oral Tab Take 1 tablet (10 mg total) by mouth nightly. 90 tablet 1    atorvastatin 10 MG Oral Tab Take 1 tablet (10 mg total) by mouth nightly. 90 tablet 1    amLODIPine Besy-Benazepril HCl 10-40 MG Oral Cap Take 1 capsule by mouth daily. 90 capsule 1    Glucose Blood (ONETOUCH ULTRA) In Vitro Strip Check blood sugar once daily 100 each 3    OneTouch Delica Lancets 33G Does not apply Misc Use to check blood sugar  each 3    Thiamine HCl (VITAMIN B-1 OR) Take by mouth.      Blood Glucose Monitoring Suppl (ONETOUCH ULTRA 2) W/DEVICE Does not apply Kit Use as directed 1 kit 0   [6] No Known Allergies  [7]   Patient Active Problem List  Diagnosis    Hypertension    Hyperlipidemia    Anxiety    Aggressiveness    Major depression, single episode    Memory deficit    Type 2 diabetes mellitus without complication (HCC)    Vitamin B12 deficiency (non anemic)    Vitamin D deficiency    Alzheimer's disease, unspecified (HCC)    Tingling of both feet    Sensory motor neuropathy    Injury of head    Alcoholism (HCC)    Epiretinal membrane (ERM) of left eye    Diabetic retinopathy not detected    Age-related nuclear cataract,  bilateral

## 2025-05-19 ENCOUNTER — TELEPHONE (OUTPATIENT)
Age: 75
End: 2025-05-19

## 2025-05-19 NOTE — TELEPHONE ENCOUNTER
Hello,     The [facility] Navigation team has attempted to reach you regarding an order from Dr. Flores's office. We are reaching out in order to assist you in coordinating care and resources that may meet your needs. Please give our office a call at [phone]. For more immediate assistance you can contact our 24-hour help line at [phone]. We look forward to hearing from you soon.

## 2025-05-22 ENCOUNTER — TELEPHONE (OUTPATIENT)
Age: 75
End: 2025-05-22

## 2025-05-22 NOTE — TELEPHONE ENCOUNTER
Darwin Shane,    Here are some resources that may be a good fit for Rodrigo. Please verify your insurance coverage with any providers that you may choose to call and schedule with directly. If there is anything else I can assist with, then please give me a call at [phone]. If you need more immediate assistance, or assistance outside of business hours, please contact the [facility] 24/7 helpline at [phone].     [facility]  [address], [place]   Phone: [phone]    [facility]  [address] [place] [address]  Phone: [phone]    [facility]  [address], [place]  Phone: [phone]

## 2025-05-28 ENCOUNTER — TELEPHONE (OUTPATIENT)
Dept: ORTHOPEDICS CLINIC | Facility: CLINIC | Age: 75
End: 2025-05-28

## 2025-06-03 NOTE — TELEPHONE ENCOUNTER
Called pt's daughter nickie. No answer, left a general voicemail asking for a call back to help schedule an appt with Dr. Padron. Provided neurology office number.

## 2025-06-04 DIAGNOSIS — B35.1 ONYCHOMYCOSIS: Primary | ICD-10-CM

## 2025-06-17 ENCOUNTER — TELEPHONE (OUTPATIENT)
Dept: PODIATRY CLINIC | Facility: CLINIC | Age: 75
End: 2025-06-17

## 2025-06-17 NOTE — TELEPHONE ENCOUNTER
Addressed in 6/4 MyChart message ready by pt.  Awaiting completion of HFP labs.    ----- Message from Samir Jackson sent at 6/13/2025  2:33 PM CDT -----  Results reviewed.  Please inform patient that fungal culture results are positive and we should proceed with Lamisil tablet therapy.  If the patient agrees we have to do a hepatic function panel,   please place that order for me with a diagnosis of onychomycosis.  ----- Message -----  From: Lab, Background User  Sent: 5/18/2025   8:01 AM CDT  To: Samir Jackson DPM

## 2025-07-03 DIAGNOSIS — E11.65 UNCONTROLLED TYPE 2 DIABETES MELLITUS WITH HYPERGLYCEMIA (HCC): ICD-10-CM

## 2025-07-03 NOTE — TELEPHONE ENCOUNTER
Faxed refill request received from:    Pharmacy name/location:      [id number] - [place], [place] - [facility] [address], [phone], [phone]  [address] [place] [address]  Phone: [phone] Fax: [phone]     Medication and dose requested: Current Medications[1]     empagliflozin (JARDIANCE) 25 MG Oral Tab Sig:   Take 1 tablet (25 mg total) by mouth daily.     Additional notes:          [1]   Current Outpatient Medications   Medication Sig Dispense Refill    hydroCHLOROthiazide 25 MG Oral Tab take 1 tablet by mouth in the morning Orally Once a day for 30      nystatin-triamcinolone 100,000-0.1 Units/g-% External Cream Apply 1 Application topically 2 (two) times daily. 60 g 0    metFORMIN HCl 1000 MG Oral Tab Take 1 tablet (1,000 mg total) by mouth 2 (two) times daily with meals. 180 tablet 1    thiamine 100 MG Oral Tab Take 1 tablet (100 mg total) by mouth daily. 30 tablet 2    glimepiride 4 MG Oral Tab Take 1 tablet (4 mg total) by mouth daily with breakfast. 90 tablet 1    empagliflozin (JARDIANCE) 25 MG Oral Tab Take 1 tablet (25 mg total) by mouth daily. 90 tablet 1    donepezil 10 MG Oral Tab Take 1 tablet (10 mg total) by mouth nightly. 90 tablet 1    atorvastatin 10 MG Oral Tab Take 1 tablet (10 mg total) by mouth nightly. 90 tablet 1    amLODIPine Besy-Benazepril HCl 10-40 MG Oral Cap Take 1 capsule by mouth daily. 90 capsule 1    Glucose Blood (ONETOUCH ULTRA) In Vitro Strip Check blood sugar once daily 100 each 3    OneTouch Delica Lancets 33G Does not apply Misc Use to check blood sugar  each 3    Thiamine HCl (VITAMIN B-1 OR) Take by mouth.      Blood Glucose Monitoring Suppl (ONETOUCH ULTRA 2) W/DEVICE Does not apply Kit Use as directed 1 kit 0

## 2025-07-08 ENCOUNTER — IMAGING SERVICES (OUTPATIENT)
Dept: OTHER | Age: 75
End: 2025-07-08

## 2025-07-08 ENCOUNTER — EXTERNAL LAB (OUTPATIENT)
Dept: HEALTH INFORMATION MANAGEMENT | Facility: OTHER | Age: 75
End: 2025-07-08

## 2025-07-08 ENCOUNTER — APPOINTMENT (OUTPATIENT)
Dept: MRI IMAGING | Age: 75
DRG: 065 | End: 2025-07-08

## 2025-07-08 ENCOUNTER — APPOINTMENT (OUTPATIENT)
Dept: CT IMAGING | Age: 75
DRG: 065 | End: 2025-07-08

## 2025-07-08 ENCOUNTER — HOSPITAL ENCOUNTER (INPATIENT)
Age: 75
LOS: 3 days | Discharge: HOME HEALTH | DRG: 065 | End: 2025-07-11
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
## 2025-07-08 DIAGNOSIS — E11.9 TYPE 2 DIABETES MELLITUS WITHOUT COMPLICATION, WITHOUT LONG-TERM CURRENT USE OF INSULIN (CMD): Primary | ICD-10-CM

## 2025-07-08 LAB
ABO + RH BLD: NORMAL
ANION GAP SERPL CALC-SCNC: 13 MEQ/L (ref 6–15)
APTT PPP: 23 SEC (ref 22–32)
APTT PPP: 32 SECONDS (ref 25–36)
BASOPHILS # BLD: 0.1 10*3/UL (ref 0–0.14)
BASOPHILS NFR BLD: 0.3 %
BLD GP AB SCN SERPL QL GEL: NEGATIVE
BUN SERPL-MCNC: 9 MG/DL (ref 7–25)
BUN/CREAT SERPL: 10 (ref 7.4–23)
CALCIUM SERPL-MCNC: 9.4 MG/DL (ref 8.6–10.3)
CHLORIDE SERPL-SCNC: 95 MEQ/L (ref 98–107)
CLOSURE TME BLD-IMP: NORMAL
CLOSURE TME COLL+EPINEP BLD: 85 SEC (ref 70–160)
CO2 SERPL-SCNC: 29 MEQ/L (ref 21–31)
CREAT SERPL-MCNC: 0.9 MG/DL (ref 0.7–1.3)
EOSINOPHIL # BLD: 0.1 10*3/UL (ref 0–0.6)
EOSINOPHIL NFR BLD: 0.3 %
ERYTHROCYTE [DISTWIDTH] IN BLOOD: 13.6 % (ref 11.9–15.9)
GFR SERPLBLD SCHWARTZ-ARVRAT: >60 ML/MIN ML/MIN
GLUCOSE BLDC GLUCOMTR-MCNC: 223 MG/DL (ref 70–99)
GLUCOSE BLDC GLUCOMTR-MCNC: 295 MG/DL (ref 70–99)
GLUCOSE SERPL-MCNC: 344 MG/DL (ref 70–99)
HBA1C MFR BLD: 12.2 % (ref 4.5–5.6)
HCT VFR BLD CALC: 50 % (ref 38.6–49.2)
HGB BLD-MCNC: 16.7 GM/DL (ref 13–17)
INR PPP: 1
INR PPP: 1 (ref 0.8–1.1)
LAB RESULT: NORMAL
LENGTH OF FAST TIME PATIENT: ABNORMAL H
LYMPHOCYTES # BLD: 1.4 10*3/UL (ref 0.78–3.73)
LYMPHOCYTES NFR BLD: 7.3 %
MCH RBC QN AUTO: 30.9 PG (ref 26–34)
MCHC RBC AUTO-ENTMCNC: 33.3 G/DL (ref 32.5–35.8)
MCV RBC AUTO: 92.8 FL (ref 80–100)
MONOCYTES # BLD: 1.4 10*3/UL (ref 0.17–1)
MONOCYTES NFR BLD: 7.2 %
NEUTROPHILS # BLD: 16.6 10*3/UL (ref 1.91–7.6)
NEUTROPHILS NFR BLD: 84.9 %
PLATELET # BLD: 378 K/MM3 (ref 150–450)
PMV BLD AUTO: 8.1 FL (ref 6.8–10.2)
POTASSIUM SERPL-SCNC: 4 MEQ/L (ref 3.5–5.2)
PROTHROMBIN TIME: 10.9 SEC (ref 9.7–11.8)
PROTHROMBIN TIME: 11.4 SECONDS (ref 10.1–13.1)
RAINBOW EXTRA TUBES HOLD SPECIMEN: NORMAL
RBC # BLD: 5.39 M/MM3 (ref 4.34–5.6)
SODIUM SERPL-SCNC: 137 MEQ/L (ref 133–144)
TYPE AND SCREEN EXPIRATION DATE: NORMAL
WBC # BLD: 19.5 K/MM3 (ref 4–11)

## 2025-07-08 PROCEDURE — 83036 HEMOGLOBIN GLYCOSYLATED A1C: CPT

## 2025-07-08 PROCEDURE — 10002019 HB COUNTER RESP ASSESSMENT

## 2025-07-08 PROCEDURE — 10002800 HB RX 250 W HCPCS

## 2025-07-08 PROCEDURE — 85730 THROMBOPLASTIN TIME PARTIAL: CPT

## 2025-07-08 PROCEDURE — 99291 CRITICAL CARE FIRST HOUR: CPT

## 2025-07-08 PROCEDURE — A4216 STERILE WATER/SALINE, 10 ML: HCPCS

## 2025-07-08 PROCEDURE — 85576 BLOOD PLATELET AGGREGATION: CPT

## 2025-07-08 PROCEDURE — 70450 CT HEAD/BRAIN W/O DYE: CPT

## 2025-07-08 PROCEDURE — 10000008 HB ROOM CHARGE ICU OR CCU

## 2025-07-08 PROCEDURE — 85610 PROTHROMBIN TIME: CPT

## 2025-07-08 PROCEDURE — 36415 COLL VENOUS BLD VENIPUNCTURE: CPT

## 2025-07-08 PROCEDURE — 10004651 HB RX, NO CHARGE ITEM

## 2025-07-08 PROCEDURE — 99284 EMERGENCY DEPT VISIT MOD MDM: CPT

## 2025-07-08 PROCEDURE — 86850 RBC ANTIBODY SCREEN: CPT

## 2025-07-08 PROCEDURE — 10004180 HB COUNTER-TRANSPORT

## 2025-07-08 PROCEDURE — G0390 TRAUMA RESPONS W/HOSP CRITI: HCPCS

## 2025-07-08 PROCEDURE — 72141 MRI NECK SPINE W/O DYE: CPT

## 2025-07-08 PROCEDURE — 10002807 HB RX 258

## 2025-07-08 PROCEDURE — 99222 1ST HOSP IP/OBS MODERATE 55: CPT

## 2025-07-08 PROCEDURE — 96372 THER/PROPH/DIAG INJ SC/IM: CPT

## 2025-07-08 RX ORDER — ONDANSETRON 2 MG/ML
4 INJECTION INTRAMUSCULAR; INTRAVENOUS EVERY 12 HOURS PRN
Status: DISCONTINUED | OUTPATIENT
Start: 2025-07-08 | End: 2025-07-11 | Stop reason: HOSPADM

## 2025-07-08 RX ORDER — HYDRALAZINE HYDROCHLORIDE 20 MG/ML
10 INJECTION INTRAMUSCULAR; INTRAVENOUS EVERY 6 HOURS PRN
Status: DISCONTINUED | OUTPATIENT
Start: 2025-07-08 | End: 2025-07-09

## 2025-07-08 RX ORDER — ACETAMINOPHEN 650 MG/1
650 SUPPOSITORY RECTAL EVERY 4 HOURS PRN
Status: DISCONTINUED | OUTPATIENT
Start: 2025-07-08 | End: 2025-07-11 | Stop reason: HOSPADM

## 2025-07-08 RX ORDER — NICOTINE POLACRILEX 4 MG
30 LOZENGE BUCCAL PRN
Status: DISCONTINUED | OUTPATIENT
Start: 2025-07-08 | End: 2025-07-11 | Stop reason: HOSPADM

## 2025-07-08 RX ORDER — LEVETIRACETAM 500 MG/5ML
500 INJECTION, SOLUTION, CONCENTRATE INTRAVENOUS EVERY 12 HOURS SCHEDULED
Status: DISCONTINUED | OUTPATIENT
Start: 2025-07-08 | End: 2025-07-10 | Stop reason: ALTCHOICE

## 2025-07-08 RX ORDER — SODIUM CHLORIDE 9 MG/ML
INJECTION, SOLUTION INTRAVENOUS CONTINUOUS
Status: DISCONTINUED | OUTPATIENT
Start: 2025-07-08 | End: 2025-07-09

## 2025-07-08 RX ORDER — ONDANSETRON 4 MG/1
4 TABLET, ORALLY DISINTEGRATING ORAL EVERY 12 HOURS PRN
Status: DISCONTINUED | OUTPATIENT
Start: 2025-07-08 | End: 2025-07-11 | Stop reason: HOSPADM

## 2025-07-08 RX ORDER — DEXTROSE MONOHYDRATE 25 G/50ML
12.5 INJECTION, SOLUTION INTRAVENOUS PRN
Status: DISCONTINUED | OUTPATIENT
Start: 2025-07-08 | End: 2025-07-11 | Stop reason: HOSPADM

## 2025-07-08 RX ORDER — DEXTROSE MONOHYDRATE 25 G/50ML
25 INJECTION, SOLUTION INTRAVENOUS PRN
Status: DISCONTINUED | OUTPATIENT
Start: 2025-07-08 | End: 2025-07-11 | Stop reason: HOSPADM

## 2025-07-08 RX ORDER — AMOXICILLIN 250 MG
2 CAPSULE ORAL 2 TIMES DAILY PRN
Status: DISCONTINUED | OUTPATIENT
Start: 2025-07-08 | End: 2025-07-11 | Stop reason: HOSPADM

## 2025-07-08 RX ORDER — POLYETHYLENE GLYCOL 3350 17 G/17G
17 POWDER, FOR SOLUTION ORAL DAILY PRN
Status: DISCONTINUED | OUTPATIENT
Start: 2025-07-08 | End: 2025-07-11 | Stop reason: HOSPADM

## 2025-07-08 RX ORDER — FOLIC ACID 5 MG/ML
1 INJECTION, SOLUTION INTRAMUSCULAR; INTRAVENOUS; SUBCUTANEOUS DAILY
Status: DISCONTINUED | OUTPATIENT
Start: 2025-07-08 | End: 2025-07-10 | Stop reason: ALTCHOICE

## 2025-07-08 RX ORDER — GLIMEPIRIDE 4 MG/1
4 TABLET ORAL
COMMUNITY

## 2025-07-08 RX ORDER — 0.9 % SODIUM CHLORIDE 0.9 %
10 VIAL (ML) INJECTION PRN
Status: DISCONTINUED | OUTPATIENT
Start: 2025-07-08 | End: 2025-07-11 | Stop reason: HOSPADM

## 2025-07-08 RX ORDER — DONEPEZIL HYDROCHLORIDE 10 MG/1
10 TABLET, FILM COATED ORAL NIGHTLY
COMMUNITY

## 2025-07-08 RX ORDER — ACETAMINOPHEN 325 MG/1
650 TABLET ORAL EVERY 4 HOURS PRN
Status: DISCONTINUED | OUTPATIENT
Start: 2025-07-08 | End: 2025-07-11 | Stop reason: HOSPADM

## 2025-07-08 RX ORDER — NICOTINE POLACRILEX 4 MG
15 LOZENGE BUCCAL PRN
Status: DISCONTINUED | OUTPATIENT
Start: 2025-07-08 | End: 2025-07-11 | Stop reason: HOSPADM

## 2025-07-08 RX ORDER — ALBUTEROL SULFATE 90 UG/1
2 INHALANT RESPIRATORY (INHALATION)
Status: DISCONTINUED | OUTPATIENT
Start: 2025-07-08 | End: 2025-07-08

## 2025-07-08 RX ORDER — 0.9 % SODIUM CHLORIDE 0.9 %
2 VIAL (ML) INJECTION EVERY 12 HOURS SCHEDULED
Status: DISCONTINUED | OUTPATIENT
Start: 2025-07-08 | End: 2025-07-11 | Stop reason: HOSPADM

## 2025-07-08 RX ORDER — THIAMINE HYDROCHLORIDE 100 MG/ML
100 INJECTION, SOLUTION INTRAMUSCULAR; INTRAVENOUS DAILY
Status: DISCONTINUED | OUTPATIENT
Start: 2025-07-08 | End: 2025-07-10 | Stop reason: ALTCHOICE

## 2025-07-08 RX ORDER — ALBUTEROL SULFATE 0.83 MG/ML
2.5 SOLUTION RESPIRATORY (INHALATION)
Status: DISCONTINUED | OUTPATIENT
Start: 2025-07-08 | End: 2025-07-11 | Stop reason: HOSPADM

## 2025-07-08 RX ORDER — BISACODYL 10 MG
10 SUPPOSITORY, RECTAL RECTAL DAILY PRN
Status: DISCONTINUED | OUTPATIENT
Start: 2025-07-08 | End: 2025-07-11 | Stop reason: HOSPADM

## 2025-07-08 RX ADMIN — INSULIN LISPRO 2 UNITS: 100 INJECTION, SOLUTION INTRAVENOUS; SUBCUTANEOUS at 18:36

## 2025-07-08 RX ADMIN — INSULIN LISPRO 2 UNITS: 100 INJECTION, SOLUTION INTRAVENOUS; SUBCUTANEOUS at 15:11

## 2025-07-08 RX ADMIN — LEVETIRACETAM 500 MG: 100 INJECTION, SOLUTION INTRAVENOUS at 15:12

## 2025-07-08 RX ADMIN — SODIUM CHLORIDE, PRESERVATIVE FREE 2 ML: 5 INJECTION INTRAVENOUS at 20:54

## 2025-07-08 RX ADMIN — THIAMINE HYDROCHLORIDE 100 MG: 100 INJECTION, SOLUTION INTRAMUSCULAR; INTRAVENOUS at 15:12

## 2025-07-08 RX ADMIN — SODIUM CHLORIDE: 9 INJECTION, SOLUTION INTRAVENOUS at 12:05

## 2025-07-08 RX ADMIN — LEVETIRACETAM 500 MG: 100 INJECTION, SOLUTION INTRAVENOUS at 20:54

## 2025-07-08 RX ADMIN — FOLIC ACID 1 MG: 5 INJECTION, SOLUTION INTRAMUSCULAR; INTRAVENOUS; SUBCUTANEOUS at 15:12

## 2025-07-08 RX ADMIN — HYDRALAZINE HYDROCHLORIDE 10 MG: 20 INJECTION INTRAMUSCULAR; INTRAVENOUS at 14:25

## 2025-07-08 RX ADMIN — SODIUM CHLORIDE, PRESERVATIVE FREE 2 ML: 5 INJECTION INTRAVENOUS at 15:12

## 2025-07-08 RX ADMIN — SODIUM CHLORIDE: 9 INJECTION, SOLUTION INTRAVENOUS at 17:12

## 2025-07-08 RX ADMIN — SODIUM CHLORIDE 500 ML: 0.9 INJECTION, SOLUTION INTRAVENOUS at 16:00

## 2025-07-08 SDOH — ECONOMIC STABILITY: INCOME INSECURITY: IN THE PAST 12 MONTHS, HAS THE ELECTRIC, GAS, OIL, OR WATER COMPANY THREATENED TO SHUT OFF SERVICE IN YOUR HOME?: NO

## 2025-07-08 SDOH — ECONOMIC STABILITY: HOUSING INSECURITY: DO YOU HAVE PROBLEMS WITH ANY OF THE FOLLOWING?: NONE OF THE ABOVE

## 2025-07-08 SDOH — SOCIAL STABILITY: SOCIAL INSECURITY: HOW OFTEN DOES ANYONE, INCLUDING FAMILY AND FRIENDS, SCREAM OR CURSE AT YOU?: NEVER

## 2025-07-08 SDOH — ECONOMIC STABILITY: HOUSING INSECURITY: WHAT IS YOUR LIVING SITUATION TODAY?: SPOUSE;CHILDREN

## 2025-07-08 SDOH — HEALTH STABILITY: GENERAL: BECAUSE OF A PHYSICAL, MENTAL, OR EMOTIONAL CONDITION, DO YOU HAVE DIFFICULTY DOING ERRANDS ALONE?: YES

## 2025-07-08 SDOH — SOCIAL STABILITY: SOCIAL INSECURITY: HOW OFTEN DOES ANYONE, INCLUDING FAMILY AND FRIENDS, INSULT OR TALK DOWN TO YOU?: NEVER

## 2025-07-08 SDOH — ECONOMIC STABILITY: HOUSING INSECURITY: WHAT IS YOUR LIVING SITUATION TODAY?: HOUSE

## 2025-07-08 SDOH — ECONOMIC STABILITY: FOOD INSECURITY: WITHIN THE PAST 12 MONTHS, THE FOOD YOU BOUGHT JUST DIDN'T LAST AND YOU DIDN'T HAVE MONEY TO GET MORE.: NEVER TRUE

## 2025-07-08 SDOH — ECONOMIC STABILITY: TRANSPORTATION INSECURITY
IN THE PAST 12 MONTHS, HAS LACK OF RELIABLE TRANSPORTATION KEPT YOU FROM MEDICAL APPOINTMENTS, MEETINGS, WORK OR FROM GETTING THINGS NEEDED FOR DAILY LIVING?: NO

## 2025-07-08 SDOH — SOCIAL STABILITY: SOCIAL NETWORK
HOW OFTEN DO YOU SEE OR TALK TO PEOPLE THAT YOU CARE ABOUT AND FEEL CLOSE TO? (FOR EXAMPLE: TALKING TO FRIENDS ON THE PHONE, VISITING FRIENDS OR FAMILY, GOING TO CHURCH OR CLUB MEETINGS): 5 OR MORE TIMES A WEEK

## 2025-07-08 SDOH — HEALTH STABILITY: PHYSICAL HEALTH: DO YOU HAVE SERIOUS DIFFICULTY WALKING OR CLIMBING STAIRS?: NO

## 2025-07-08 SDOH — SOCIAL STABILITY: SOCIAL INSECURITY: HOW OFTEN DOES ANYONE, INCLUDING FAMILY AND FRIENDS, THREATEN YOU WITH HARM?: NEVER

## 2025-07-08 SDOH — ECONOMIC STABILITY: GENERAL

## 2025-07-08 SDOH — ECONOMIC STABILITY: HOUSING INSECURITY: WHAT IS YOUR LIVING SITUATION TODAY?: I HAVE A STEADY PLACE TO LIVE

## 2025-07-08 SDOH — HEALTH STABILITY: PHYSICAL HEALTH: DO YOU HAVE DIFFICULTY DRESSING OR BATHING?: YES

## 2025-07-08 SDOH — SOCIAL STABILITY: SOCIAL INSECURITY: HOW OFTEN DOES ANYONE, INCLUDING FAMILY AND FRIENDS, PHYSICALLY HURT YOU?: NEVER

## 2025-07-08 SDOH — HEALTH STABILITY: GENERAL
BECAUSE OF A PHYSICAL, MENTAL, OR EMOTIONAL CONDITION, DO YOU HAVE SERIOUS DIFFICULTY CONCENTRATING, REMEMBERING OR MAKING DECISIONS?: YES

## 2025-07-08 SDOH — SOCIAL STABILITY: SOCIAL NETWORK: SUPPORT SYSTEMS: CHILDREN;FAMILY MEMBERS;FRIENDS;SPOUSE

## 2025-07-08 ASSESSMENT — LIFESTYLE VARIABLES
HOW OFTEN DO YOU HAVE 6 OR MORE DRINKS ON ONE OCCASION: WEEKLY
HOW MANY STANDARD DRINKS CONTAINING ALCOHOL DO YOU HAVE ON A TYPICAL DAY: 3 OR 4
AUDIT-C TOTAL SCORE: 7
ALCOHOL_USE_STATUS: UNABLE TO SCREEN COGNITIVE IMPAIRMENT
HOW OFTEN DO YOU HAVE A DRINK CONTAINING ALCOHOL: 2 TO 3 TIMES PER WEEK

## 2025-07-08 ASSESSMENT — PAIN SCALES - GENERAL
PAINLEVEL_OUTOF10: 0
PAINLEVEL_OUTOF10: 0
PAINLEVEL_OUTOF10: 2

## 2025-07-08 ASSESSMENT — ACTIVITIES OF DAILY LIVING (ADL)
BATHING: INDEPENDENT
FEEDING: INDEPENDENT
ADL_SHORT_OF_BREATH: NO
TOILETING: INDEPENDENT
ADL_BEFORE_ADMISSION: NEEDS/REQUIRES ASSISTANCE
ADL_SCORE: 11
DRESSING: NEEDS ASSISTANCE
RECENT_DECLINE_ADL: NO

## 2025-07-08 ASSESSMENT — MOVEMENT AND STRENGTH ASSESSMENTS
LLE_ASSESSMENT: NORMAL/COMPLETE ROM AGAINST GRAVITY WITH FULL RESISTANCE
RUE_ASSESSMENT: NORMAL/COMPLETE ROM AGAINST GRAVITY WITH FULL RESISTANCE
LUE_ASSESSMENT: NORMAL/COMPLETE ROM AGAINST GRAVITY WITH FULL RESISTANCE
RUE_ASSESSMENT: NORMAL/COMPLETE ROM AGAINST GRAVITY WITH FULL RESISTANCE
LLE_ASSESSMENT: NORMAL/COMPLETE ROM AGAINST GRAVITY WITH FULL RESISTANCE
RLE_ASSESSMENT: NORMAL/COMPLETE ROM AGAINST GRAVITY WITH FULL RESISTANCE
LUE_ASSESSMENT: NORMAL/COMPLETE ROM AGAINST GRAVITY WITH FULL RESISTANCE
RLE_ASSESSMENT: NORMAL/COMPLETE ROM AGAINST GRAVITY WITH FULL RESISTANCE

## 2025-07-08 ASSESSMENT — ORIENTATION MEMORY CONCENTRATION TEST (OMCT)
WHAT TIME IS IT (NO WATCH OR CLOCK): INCORRECT
SAY THE MONTHS IN REVERSE ORDER STARTING WITH LAST MONTH: 2 OR MORE ERRORS
OMCT INTERPRETATION: 11 OR GREATER: MODERATE TO SEVERE COGNITIVE IMPAIRMENT
WHAT YEAR IS IT NOW (MUST BE EXACT): INCORRECT
COUNT BACKWARDS FROM 20 TO 1: 2 OR MORE ERRORS
REPEAT THE NAME AND ADDRESS I ASKED YOU TO REMEMBER: 5 ERRORS
WHAT MONTH IS IT NOW: INCORRECT
OMCT SCORE: 28

## 2025-07-08 ASSESSMENT — PATIENT HEALTH QUESTIONNAIRE - PHQ9
IS PATIENT ABLE TO COMPLETE PHQ2 OR PHQ9: NO, PATIENT WILL NEVER BE ABLE TO COMPLETE
IS PATIENT ABLE TO COMPLETE PHQ2 OR PHQ9: NO, PATIENT WILL NEVER BE ABLE TO COMPLETE

## 2025-07-08 ASSESSMENT — COLUMBIA-SUICIDE SEVERITY RATING SCALE - C-SSRS: IS THE PATIENT ABLE TO COMPLETE C-SSRS: NO, DEFER FOR DEVELOPMENTAL DISABILITY

## 2025-07-08 ASSESSMENT — PULMONARY FUNCTION TESTS
FEV1: 0
FEV1_PREDICTED: 0
FEV1: 0
FEV1/FVC: UNABLE TO OBTAIN, OR GREATER THAN 70%
FEV1: 0

## 2025-07-08 NOTE — TELEPHONE ENCOUNTER
Please review.  Protocol failed/has no protocol.    Last Office Visit: 05/14/2025  Please advise if refill is appropriate.  Requested Prescriptions     Pending Prescriptions Disp Refills    empagliflozin (JARDIANCE) 25 MG Oral Tab 90 tablet 1     Sig: Take 1 tablet (25 mg total) by mouth daily.

## 2025-07-09 ENCOUNTER — APPOINTMENT (OUTPATIENT)
Dept: CT IMAGING | Age: 75
DRG: 065 | End: 2025-07-09
Attending: NURSE PRACTITIONER

## 2025-07-09 ENCOUNTER — APPOINTMENT (OUTPATIENT)
Dept: CT IMAGING | Age: 75
DRG: 065 | End: 2025-07-09

## 2025-07-09 LAB
ANION GAP SERPL CALC-SCNC: 8 MMOL/L (ref 7–19)
BUN SERPL-MCNC: 12 MG/DL (ref 6–20)
BUN/CREAT SERPL: 18 (ref 7–25)
CALCIUM SERPL-MCNC: 8.3 MG/DL (ref 8.4–10.2)
CHLORIDE SERPL-SCNC: 107 MMOL/L (ref 97–110)
CO2 SERPL-SCNC: 26 MMOL/L (ref 21–32)
CREAT SERPL-MCNC: 0.65 MG/DL (ref 0.67–1.17)
DEPRECATED RDW RBC: 44.7 FL (ref 39–50)
EGFRCR SERPLBLD CKD-EPI 2021: >90 ML/MIN/{1.73_M2}
ERYTHROCYTE [DISTWIDTH] IN BLOOD: 13 % (ref 11–15)
FASTING DURATION TIME PATIENT: ABNORMAL H
GLUCOSE BLDC GLUCOMTR-MCNC: 228 MG/DL (ref 70–99)
GLUCOSE BLDC GLUCOMTR-MCNC: 229 MG/DL (ref 70–99)
GLUCOSE BLDC GLUCOMTR-MCNC: 270 MG/DL (ref 70–99)
GLUCOSE BLDC GLUCOMTR-MCNC: 337 MG/DL (ref 70–99)
GLUCOSE SERPL-MCNC: 184 MG/DL (ref 70–99)
HCT VFR BLD CALC: 42.8 % (ref 39–51)
HGB BLD-MCNC: 14.1 G/DL (ref 13–17)
MAGNESIUM SERPL-MCNC: 1.8 MG/DL (ref 1.7–2.4)
MCH RBC QN AUTO: 30.9 PG (ref 26–34)
MCHC RBC AUTO-ENTMCNC: 32.9 G/DL (ref 32–36.5)
MCV RBC AUTO: 93.9 FL (ref 78–100)
NRBC BLD MANUAL-RTO: 0 /100 WBC
PHOSPHATE SERPL-MCNC: 2.4 MG/DL (ref 2.4–4.7)
PLATELET # BLD AUTO: 283 K/MCL (ref 140–450)
POTASSIUM SERPL-SCNC: 3.6 MMOL/L (ref 3.4–5.1)
RAINBOW EXTRA TUBES HOLD SPECIMEN: NORMAL
RBC # BLD: 4.56 MIL/MCL (ref 4.5–5.9)
SODIUM SERPL-SCNC: 137 MMOL/L (ref 135–145)
WBC # BLD: 14.3 K/MCL (ref 4.2–11)

## 2025-07-09 PROCEDURE — 10002801 HB RX 250 W/O HCPCS

## 2025-07-09 PROCEDURE — 83735 ASSAY OF MAGNESIUM: CPT

## 2025-07-09 PROCEDURE — 84100 ASSAY OF PHOSPHORUS: CPT

## 2025-07-09 PROCEDURE — 99233 SBSQ HOSP IP/OBS HIGH 50: CPT | Performed by: NURSE PRACTITIONER

## 2025-07-09 PROCEDURE — 10002800 HB RX 250 W HCPCS

## 2025-07-09 PROCEDURE — 85027 COMPLETE CBC AUTOMATED: CPT

## 2025-07-09 PROCEDURE — 97535 SELF CARE MNGMENT TRAINING: CPT

## 2025-07-09 PROCEDURE — 80048 BASIC METABOLIC PNL TOTAL CA: CPT

## 2025-07-09 PROCEDURE — 96372 THER/PROPH/DIAG INJ SC/IM: CPT | Performed by: STUDENT IN AN ORGANIZED HEALTH CARE EDUCATION/TRAINING PROGRAM

## 2025-07-09 PROCEDURE — 70450 CT HEAD/BRAIN W/O DYE: CPT

## 2025-07-09 PROCEDURE — 97530 THERAPEUTIC ACTIVITIES: CPT

## 2025-07-09 PROCEDURE — 97165 OT EVAL LOW COMPLEX 30 MIN: CPT

## 2025-07-09 PROCEDURE — 10006031 HB ROOM CHARGE TELEMETRY

## 2025-07-09 PROCEDURE — 10002807 HB RX 258

## 2025-07-09 PROCEDURE — 36415 COLL VENOUS BLD VENIPUNCTURE: CPT

## 2025-07-09 PROCEDURE — 99232 SBSQ HOSP IP/OBS MODERATE 35: CPT | Performed by: STUDENT IN AN ORGANIZED HEALTH CARE EDUCATION/TRAINING PROGRAM

## 2025-07-09 PROCEDURE — 10002800 HB RX 250 W HCPCS: Performed by: STUDENT IN AN ORGANIZED HEALTH CARE EDUCATION/TRAINING PROGRAM

## 2025-07-09 PROCEDURE — 10004651 HB RX, NO CHARGE ITEM

## 2025-07-09 PROCEDURE — 96372 THER/PROPH/DIAG INJ SC/IM: CPT

## 2025-07-09 PROCEDURE — A4216 STERILE WATER/SALINE, 10 ML: HCPCS

## 2025-07-09 RX ORDER — INSULIN GLARGINE 100 [IU]/ML
10 INJECTION, SOLUTION SUBCUTANEOUS ONCE
Status: COMPLETED | OUTPATIENT
Start: 2025-07-09 | End: 2025-07-09

## 2025-07-09 RX ORDER — HYDRALAZINE HYDROCHLORIDE 20 MG/ML
10 INJECTION INTRAMUSCULAR; INTRAVENOUS EVERY 6 HOURS PRN
Status: DISCONTINUED | OUTPATIENT
Start: 2025-07-09 | End: 2025-07-11 | Stop reason: HOSPADM

## 2025-07-09 RX ADMIN — SODIUM CHLORIDE, PRESERVATIVE FREE 2 ML: 5 INJECTION INTRAVENOUS at 09:07

## 2025-07-09 RX ADMIN — INSULIN LISPRO 2 UNITS: 100 INJECTION, SOLUTION INTRAVENOUS; SUBCUTANEOUS at 09:07

## 2025-07-09 RX ADMIN — FOLIC ACID 1 MG: 5 INJECTION, SOLUTION INTRAMUSCULAR; INTRAVENOUS; SUBCUTANEOUS at 09:11

## 2025-07-09 RX ADMIN — POTASSIUM PHOSPHATE, MONOBASIC AND POTASSIUM PHOSPHATE, DIBASIC 45 MMOL: 224; 236 INJECTION, SOLUTION, CONCENTRATE INTRAVENOUS at 06:57

## 2025-07-09 RX ADMIN — LEVETIRACETAM 500 MG: 100 INJECTION, SOLUTION INTRAVENOUS at 09:07

## 2025-07-09 RX ADMIN — SODIUM CHLORIDE, PRESERVATIVE FREE 2 ML: 5 INJECTION INTRAVENOUS at 21:04

## 2025-07-09 RX ADMIN — THIAMINE HYDROCHLORIDE 100 MG: 100 INJECTION, SOLUTION INTRAMUSCULAR; INTRAVENOUS at 09:07

## 2025-07-09 RX ADMIN — HYDRALAZINE HYDROCHLORIDE 10 MG: 20 INJECTION INTRAMUSCULAR; INTRAVENOUS at 01:45

## 2025-07-09 RX ADMIN — LEVETIRACETAM 500 MG: 100 INJECTION, SOLUTION INTRAVENOUS at 21:00

## 2025-07-09 RX ADMIN — INSULIN GLARGINE 10 UNITS: 100 INJECTION, SOLUTION SUBCUTANEOUS at 22:33

## 2025-07-09 RX ADMIN — INSULIN LISPRO 2 UNITS: 100 INJECTION, SOLUTION INTRAVENOUS; SUBCUTANEOUS at 17:20

## 2025-07-09 RX ADMIN — INSULIN LISPRO 3 UNITS: 100 INJECTION, SOLUTION INTRAVENOUS; SUBCUTANEOUS at 12:42

## 2025-07-09 ASSESSMENT — COGNITIVE AND FUNCTIONAL STATUS - GENERAL
DAILY_ACTIVITY_RAW_SCORE: 20
HELP NEEDED DRESSING REGULAR LOWER BODY CLOTHING: A LITTLE
HELP NEEDED FOR BATHING: A LITTLE
HELP NEEDED FOR PERSONAL GROOMING: A LITTLE
HELP NEEDED FOR TOILETING: A LITTLE
DO YOU HAVE SERIOUS DIFFICULTY WALKING OR CLIMBING STAIRS: NO
DAILY_ACTIVITY_CONVERTED_SCORE: 42.03

## 2025-07-09 ASSESSMENT — PAIN SCALES - GENERAL
PAINLEVEL_OUTOF10: 0

## 2025-07-09 ASSESSMENT — ACTIVITIES OF DAILY LIVING (ADL)
EATING: INDEPENDENT
GROOMING: SET-UP
PRIOR_ADL_BATHING: SET-UP
PRIOR_ADL: SUPERVISION (SUPV)
PRIOR_ADL_TOILETING: INDEPENDENT
HOME_MANAGEMENT_TIME_ENTRY: 15

## 2025-07-10 LAB
ANION GAP SERPL CALC-SCNC: 6 MMOL/L (ref 7–19)
BUN SERPL-MCNC: 14 MG/DL (ref 6–20)
BUN/CREAT SERPL: 25 (ref 7–25)
CALCIUM SERPL-MCNC: 8.6 MG/DL (ref 8.4–10.2)
CHLORIDE SERPL-SCNC: 106 MMOL/L (ref 97–110)
CO2 SERPL-SCNC: 29 MMOL/L (ref 21–32)
CREAT SERPL-MCNC: 0.55 MG/DL (ref 0.67–1.17)
DEPRECATED RDW RBC: 44.7 FL (ref 39–50)
EGFRCR SERPLBLD CKD-EPI 2021: >90 ML/MIN/{1.73_M2}
ERYTHROCYTE [DISTWIDTH] IN BLOOD: 13 % (ref 11–15)
FASTING DURATION TIME PATIENT: ABNORMAL H
GLUCOSE BLDC GLUCOMTR-MCNC: 146 MG/DL (ref 70–99)
GLUCOSE BLDC GLUCOMTR-MCNC: 210 MG/DL (ref 70–99)
GLUCOSE BLDC GLUCOMTR-MCNC: 244 MG/DL (ref 70–99)
GLUCOSE BLDC GLUCOMTR-MCNC: 309 MG/DL (ref 70–99)
GLUCOSE SERPL-MCNC: 217 MG/DL (ref 70–99)
HCT VFR BLD CALC: 39.5 % (ref 39–51)
HGB BLD-MCNC: 13 G/DL (ref 13–17)
MAGNESIUM SERPL-MCNC: 1.7 MG/DL (ref 1.7–2.4)
MCH RBC QN AUTO: 31.2 PG (ref 26–34)
MCHC RBC AUTO-ENTMCNC: 32.9 G/DL (ref 32–36.5)
MCV RBC AUTO: 94.7 FL (ref 78–100)
NRBC BLD MANUAL-RTO: 0 /100 WBC
PHOSPHATE SERPL-MCNC: 2.5 MG/DL (ref 2.4–4.7)
PLATELET # BLD AUTO: 273 K/MCL (ref 140–450)
POTASSIUM SERPL-SCNC: 3.9 MMOL/L (ref 3.4–5.1)
RBC # BLD: 4.17 MIL/MCL (ref 4.5–5.9)
SODIUM SERPL-SCNC: 137 MMOL/L (ref 135–145)
WBC # BLD: 10.2 K/MCL (ref 4.2–11)

## 2025-07-10 PROCEDURE — 10002800 HB RX 250 W HCPCS

## 2025-07-10 PROCEDURE — 97116 GAIT TRAINING THERAPY: CPT

## 2025-07-10 PROCEDURE — 96372 THER/PROPH/DIAG INJ SC/IM: CPT | Performed by: STUDENT IN AN ORGANIZED HEALTH CARE EDUCATION/TRAINING PROGRAM

## 2025-07-10 PROCEDURE — 84100 ASSAY OF PHOSPHORUS: CPT | Performed by: STUDENT IN AN ORGANIZED HEALTH CARE EDUCATION/TRAINING PROGRAM

## 2025-07-10 PROCEDURE — 10006031 HB ROOM CHARGE TELEMETRY

## 2025-07-10 PROCEDURE — 99233 SBSQ HOSP IP/OBS HIGH 50: CPT | Performed by: SURGERY

## 2025-07-10 PROCEDURE — 36415 COLL VENOUS BLD VENIPUNCTURE: CPT | Performed by: STUDENT IN AN ORGANIZED HEALTH CARE EDUCATION/TRAINING PROGRAM

## 2025-07-10 PROCEDURE — 85027 COMPLETE CBC AUTOMATED: CPT | Performed by: STUDENT IN AN ORGANIZED HEALTH CARE EDUCATION/TRAINING PROGRAM

## 2025-07-10 PROCEDURE — A4216 STERILE WATER/SALINE, 10 ML: HCPCS

## 2025-07-10 PROCEDURE — 97161 PT EVAL LOW COMPLEX 20 MIN: CPT

## 2025-07-10 PROCEDURE — 10004651 HB RX, NO CHARGE ITEM

## 2025-07-10 PROCEDURE — 83735 ASSAY OF MAGNESIUM: CPT | Performed by: STUDENT IN AN ORGANIZED HEALTH CARE EDUCATION/TRAINING PROGRAM

## 2025-07-10 PROCEDURE — 10002800 HB RX 250 W HCPCS: Performed by: STUDENT IN AN ORGANIZED HEALTH CARE EDUCATION/TRAINING PROGRAM

## 2025-07-10 PROCEDURE — 80048 BASIC METABOLIC PNL TOTAL CA: CPT | Performed by: STUDENT IN AN ORGANIZED HEALTH CARE EDUCATION/TRAINING PROGRAM

## 2025-07-10 PROCEDURE — 10002803 HB RX 637

## 2025-07-10 PROCEDURE — 96372 THER/PROPH/DIAG INJ SC/IM: CPT

## 2025-07-10 RX ORDER — LEVETIRACETAM 500 MG/1
500 TABLET ORAL EVERY 12 HOURS SCHEDULED
Status: DISCONTINUED | OUTPATIENT
Start: 2025-07-10 | End: 2025-07-11 | Stop reason: HOSPADM

## 2025-07-10 RX ORDER — ENOXAPARIN SODIUM 100 MG/ML
30 INJECTION SUBCUTANEOUS EVERY 12 HOURS SCHEDULED
Status: DISCONTINUED | OUTPATIENT
Start: 2025-07-10 | End: 2025-07-11 | Stop reason: HOSPADM

## 2025-07-10 RX ORDER — LANOLIN ALCOHOL/MO/W.PET/CERES
400 CREAM (GRAM) TOPICAL ONCE
Status: COMPLETED | OUTPATIENT
Start: 2025-07-10 | End: 2025-07-10

## 2025-07-10 RX ORDER — INSULIN GLARGINE 100 [IU]/ML
10 INJECTION, SOLUTION SUBCUTANEOUS NIGHTLY
Status: DISCONTINUED | OUTPATIENT
Start: 2025-07-10 | End: 2025-07-11 | Stop reason: HOSPADM

## 2025-07-10 RX ORDER — POTASSIUM CHLORIDE 1500 MG/1
40 TABLET, EXTENDED RELEASE ORAL ONCE
Status: COMPLETED | OUTPATIENT
Start: 2025-07-10 | End: 2025-07-10

## 2025-07-10 RX ORDER — FOLIC ACID 1 MG/1
1 TABLET ORAL DAILY
Status: DISCONTINUED | OUTPATIENT
Start: 2025-07-11 | End: 2025-07-11 | Stop reason: HOSPADM

## 2025-07-10 RX ADMIN — INSULIN LISPRO 6 UNITS: 100 INJECTION, SOLUTION INTRAVENOUS; SUBCUTANEOUS at 08:20

## 2025-07-10 RX ADMIN — ENOXAPARIN SODIUM 30 MG: 100 INJECTION SUBCUTANEOUS at 08:35

## 2025-07-10 RX ADMIN — INSULIN LISPRO 12 UNITS: 100 INJECTION, SOLUTION INTRAVENOUS; SUBCUTANEOUS at 12:34

## 2025-07-10 RX ADMIN — Medication 400 MG: at 18:24

## 2025-07-10 RX ADMIN — LEVETIRACETAM 500 MG: 100 INJECTION, SOLUTION INTRAVENOUS at 08:22

## 2025-07-10 RX ADMIN — POTASSIUM CHLORIDE 40 MEQ: 20 TABLET, EXTENDED RELEASE ORAL at 08:24

## 2025-07-10 RX ADMIN — INSULIN GLARGINE 10 UNITS: 100 INJECTION, SOLUTION SUBCUTANEOUS at 21:05

## 2025-07-10 RX ADMIN — LEVETIRACETAM 500 MG: 500 TABLET, FILM COATED ORAL at 21:05

## 2025-07-10 RX ADMIN — SODIUM CHLORIDE, PRESERVATIVE FREE 2 ML: 5 INJECTION INTRAVENOUS at 08:21

## 2025-07-10 RX ADMIN — FOLIC ACID 1 MG: 5 INJECTION, SOLUTION INTRAMUSCULAR; INTRAVENOUS; SUBCUTANEOUS at 08:34

## 2025-07-10 RX ADMIN — THIAMINE HYDROCHLORIDE 100 MG: 100 INJECTION, SOLUTION INTRAMUSCULAR; INTRAVENOUS at 08:21

## 2025-07-10 RX ADMIN — Medication 400 MG: at 08:24

## 2025-07-10 RX ADMIN — ENOXAPARIN SODIUM 30 MG: 100 INJECTION SUBCUTANEOUS at 21:05

## 2025-07-10 RX ADMIN — SODIUM CHLORIDE, PRESERVATIVE FREE 2 ML: 5 INJECTION INTRAVENOUS at 21:08

## 2025-07-10 RX ADMIN — INSULIN LISPRO 6 UNITS: 100 INJECTION, SOLUTION INTRAVENOUS; SUBCUTANEOUS at 21:05

## 2025-07-10 ASSESSMENT — GAIT ASSESSMENTS
STEP CONTINUITY: STEPS APPEAR CONTINUOUS
PATH: MILD/MODERATE DEVIATION OR USES WALKING AID
STEP LENGTH AND HEIGHT PART 2: RIGHT FOOT COMPLETELY CLEARS FLOOR
WALKING STANCE: HEELS ALMOST TOUCHING WHILE WALKING
TRUNK: MARKED SWAY OR USES WALKING AID
STEP LENGTH AND HEIGHT PART 1: RIGHT FOOT PASSES LEFT STAND FOOT
INITIATION OF GAIT IMMEDIATELY AFTER GO: NO HESITANCY
GAIT SCORE: 9
STEP LENGTH AND HEIGHT PART 4: LEFT FOOT COMPLETELY CLEARS FLOOR
STEP SYMMETRY: RIGHT AND LEFT STEP APPEAR EQUAL
STEP LENGTH AND HEIGHT PART 3: LEFT FOOT PASSES RIGHT STANCE FOOT
BALANCE AND GAIT SCORE: 18

## 2025-07-10 ASSESSMENT — BALANCE ASSESSMENTS
SITTING BALANCE: STEADY, SAFE
EYES CLOSED AT MAXIMUM POSITION NUDGED: UNSTEADY
TURNING 360 DEGREES PART 1: CONTINUOUS STEPS
SITTING DOWN: USES ARMS OR NOT A SMOOTH MOTION
ARISES: ABLE, USES ARMS TO HELP
BALANCE SCORE: 9
STANDING BALANCE: STEADY BUT WIDE STANCE (MEDIAL HEELS > 4 INCHES APART) AND USES CANE OR OTHER SUPPORT
ATTEMPTS TO ARISE: ABLE TO RISE, 1 ATTEMPT
IMMEDIATE STANDING BALANCE FIRST 5 SECONDS: STEADY BUT USES WALKER OR OTHER SUPPORT
NUDGED: STAGGERS, GRABS, CATCHES SELF
TURNING 360 DEGREES PART 2: UNSTEADY (GRABS, SWAGGERS)

## 2025-07-10 ASSESSMENT — PAIN SCALES - PAIN ASSESSMENT IN ADVANCED DEMENTIA (PAINAD)
BREATHING: NORMAL
CONSOLABILITY: NO NEED TO CONSOLE
FACIALEXPRESSION: SMILING OR INEXPRESSIVE
BODYLANGUAGE: RELAXED
TOTALSCORE: 0

## 2025-07-10 ASSESSMENT — PAIN SCALES - GENERAL
PAINLEVEL_OUTOF10: 0
PAINLEVEL_OUTOF10: 0

## 2025-07-10 ASSESSMENT — COGNITIVE AND FUNCTIONAL STATUS - GENERAL
BASIC_MOBILITY_CONVERTED_SCORE: 43.99
BASIC_MOBILITY_RAW_SCORE: 20

## 2025-07-11 VITALS
BODY MASS INDEX: 23.51 KG/M2 | DIASTOLIC BLOOD PRESSURE: 76 MMHG | HEIGHT: 65 IN | SYSTOLIC BLOOD PRESSURE: 120 MMHG | WEIGHT: 141.09 LBS | RESPIRATION RATE: 16 BRPM | HEART RATE: 69 BPM | TEMPERATURE: 97.5 F | OXYGEN SATURATION: 96 %

## 2025-07-11 LAB
GLUCOSE BLDC GLUCOMTR-MCNC: 118 MG/DL (ref 70–99)
GLUCOSE BLDC GLUCOMTR-MCNC: 125 MG/DL (ref 70–99)
GLUCOSE BLDC GLUCOMTR-MCNC: 325 MG/DL (ref 70–99)
MAGNESIUM SERPL-MCNC: 1.7 MG/DL (ref 1.7–2.4)
POTASSIUM SERPL-SCNC: 3.8 MMOL/L (ref 3.4–5.1)

## 2025-07-11 PROCEDURE — A4216 STERILE WATER/SALINE, 10 ML: HCPCS

## 2025-07-11 PROCEDURE — 83735 ASSAY OF MAGNESIUM: CPT

## 2025-07-11 PROCEDURE — 96372 THER/PROPH/DIAG INJ SC/IM: CPT

## 2025-07-11 PROCEDURE — 10002803 HB RX 637

## 2025-07-11 PROCEDURE — 84132 ASSAY OF SERUM POTASSIUM: CPT

## 2025-07-11 PROCEDURE — 36415 COLL VENOUS BLD VENIPUNCTURE: CPT

## 2025-07-11 PROCEDURE — 10002800 HB RX 250 W HCPCS

## 2025-07-11 PROCEDURE — 10004651 HB RX, NO CHARGE ITEM

## 2025-07-11 RX ORDER — POTASSIUM CHLORIDE 1500 MG/1
40 TABLET, EXTENDED RELEASE ORAL ONCE
Status: COMPLETED | OUTPATIENT
Start: 2025-07-11 | End: 2025-07-11

## 2025-07-11 RX ORDER — ACETAMINOPHEN 325 MG/1
650 TABLET ORAL EVERY 6 HOURS PRN
Status: SHIPPED | COMMUNITY
Start: 2025-07-11

## 2025-07-11 RX ORDER — LANOLIN ALCOHOL/MO/W.PET/CERES
400 CREAM (GRAM) TOPICAL ONCE
Status: COMPLETED | OUTPATIENT
Start: 2025-07-11 | End: 2025-07-11

## 2025-07-11 RX ORDER — LEVETIRACETAM 500 MG/1
500 TABLET ORAL EVERY 12 HOURS SCHEDULED
Qty: 8 TABLET | Refills: 0 | Status: SHIPPED | OUTPATIENT
Start: 2025-07-11 | End: 2025-07-15

## 2025-07-11 RX ORDER — LANOLIN ALCOHOL/MO/W.PET/CERES
400 CREAM (GRAM) TOPICAL ONCE
Status: DISCONTINUED | OUTPATIENT
Start: 2025-07-11 | End: 2025-07-11 | Stop reason: HOSPADM

## 2025-07-11 RX ADMIN — SODIUM CHLORIDE, PRESERVATIVE FREE 2 ML: 5 INJECTION INTRAVENOUS at 09:18

## 2025-07-11 RX ADMIN — Medication 400 MG: at 09:18

## 2025-07-11 RX ADMIN — ENOXAPARIN SODIUM 30 MG: 100 INJECTION SUBCUTANEOUS at 09:19

## 2025-07-11 RX ADMIN — POTASSIUM CHLORIDE 40 MEQ: 1500 TABLET, EXTENDED RELEASE ORAL at 09:21

## 2025-07-11 RX ADMIN — LEVETIRACETAM 500 MG: 500 TABLET, FILM COATED ORAL at 09:19

## 2025-07-11 RX ADMIN — FOLIC ACID 1 MG: 1 TABLET ORAL at 09:18

## 2025-07-11 RX ADMIN — Medication 100 MG: at 09:18

## 2025-07-11 RX ADMIN — INSULIN LISPRO 12 UNITS: 100 INJECTION, SOLUTION INTRAVENOUS; SUBCUTANEOUS at 12:51

## 2025-07-11 ASSESSMENT — PAIN SCALES - GENERAL: PAINLEVEL_OUTOF10: 0

## 2025-07-15 ENCOUNTER — TELEPHONE (OUTPATIENT)
Dept: FAMILY MEDICINE CLINIC | Facility: CLINIC | Age: 75
End: 2025-07-15

## 2025-07-15 NOTE — TELEPHONE ENCOUNTER
Pts spouse called office asking to speak to dr personally in regards to pts hospitalization due to drinking and dementia.

## 2025-07-15 NOTE — TELEPHONE ENCOUNTER
Pt in hospital again for dementia, drinking and keeps leaving house.  Wife wants to know if Pt can get admitted to a facility for long term.  Advised wife to discuss with nursing,  and to ask for Psych evaluation while he is in hospital.

## 2025-07-18 NOTE — TELEPHONE ENCOUNTER
Noted.  Yes will have ot go through  at hospital where he is at so they can find an in network residential facility with insurance. They will facilitate this so that he can hopefully be discharged to this setting. Thanks.

## 2025-07-23 ENCOUNTER — OFFICE VISIT (OUTPATIENT)
Dept: FAMILY MEDICINE CLINIC | Facility: CLINIC | Age: 75
End: 2025-07-23
Payer: MEDICARE

## 2025-07-23 ENCOUNTER — LAB ENCOUNTER (OUTPATIENT)
Dept: LAB | Facility: HOSPITAL | Age: 75
End: 2025-07-23
Attending: PODIATRIST
Payer: MEDICARE

## 2025-07-23 ENCOUNTER — MED REC SCAN ONLY (OUTPATIENT)
Dept: FAMILY MEDICINE CLINIC | Facility: CLINIC | Age: 75
End: 2025-07-23

## 2025-07-23 VITALS
WEIGHT: 141.38 LBS | BODY MASS INDEX: 22.19 KG/M2 | SYSTOLIC BLOOD PRESSURE: 120 MMHG | TEMPERATURE: 98 F | OXYGEN SATURATION: 95 % | DIASTOLIC BLOOD PRESSURE: 80 MMHG | RESPIRATION RATE: 18 BRPM | HEIGHT: 67 IN | HEART RATE: 79 BPM

## 2025-07-23 DIAGNOSIS — R26.81 GAIT INSTABILITY: ICD-10-CM

## 2025-07-23 DIAGNOSIS — G30.9 ALZHEIMER'S DISEASE, UNSPECIFIED (HCC): ICD-10-CM

## 2025-07-23 DIAGNOSIS — F02.80 ALZHEIMER'S DISEASE, UNSPECIFIED (HCC): ICD-10-CM

## 2025-07-23 DIAGNOSIS — S06.6X9S: ICD-10-CM

## 2025-07-23 DIAGNOSIS — I62.00 SUBDURAL HEMORRHAGE (HCC): ICD-10-CM

## 2025-07-23 DIAGNOSIS — E78.5 HYPERLIPIDEMIA, UNSPECIFIED HYPERLIPIDEMIA TYPE: ICD-10-CM

## 2025-07-23 DIAGNOSIS — I10 BENIGN ESSENTIAL HYPERTENSION: ICD-10-CM

## 2025-07-23 DIAGNOSIS — E11.65 UNCONTROLLED TYPE 2 DIABETES MELLITUS WITH HYPERGLYCEMIA (HCC): Primary | ICD-10-CM

## 2025-07-23 DIAGNOSIS — F10.20 ALCOHOLISM (HCC): ICD-10-CM

## 2025-07-23 LAB
ALBUMIN SERPL-MCNC: 4.6 G/DL (ref 3.2–4.8)
ALBUMIN/GLOB SERPL: 1.3 {RATIO} (ref 1–2)
ALP LIVER SERPL-CCNC: 86 U/L (ref 45–117)
ALT SERPL-CCNC: 12 U/L (ref 10–49)
ANION GAP SERPL CALC-SCNC: 6 MMOL/L (ref 0–18)
AST SERPL-CCNC: 13 U/L (ref ?–34)
BASOPHILS # BLD AUTO: 0.06 X10(3) UL (ref 0–0.2)
BASOPHILS NFR BLD AUTO: 0.7 %
BILIRUB DIRECT SERPL-MCNC: 0.2 MG/DL (ref ?–0.3)
BILIRUB SERPL-MCNC: 0.7 MG/DL (ref 0.2–1.1)
BUN BLD-MCNC: 15 MG/DL (ref 9–23)
CALCIUM BLD-MCNC: 9.8 MG/DL (ref 8.7–10.6)
CHLORIDE SERPL-SCNC: 96 MMOL/L (ref 98–112)
CHOLEST SERPL-MCNC: 144 MG/DL (ref ?–200)
CO2 SERPL-SCNC: 34 MMOL/L (ref 21–32)
CREAT BLD-MCNC: 1.24 MG/DL (ref 0.7–1.3)
EGFRCR SERPLBLD CKD-EPI 2021: 61 ML/MIN/1.73M2 (ref 60–?)
EOSINOPHIL # BLD AUTO: 0.31 X10(3) UL (ref 0–0.7)
EOSINOPHIL NFR BLD AUTO: 3.4 %
ERYTHROCYTE [DISTWIDTH] IN BLOOD BY AUTOMATED COUNT: 12.1 %
EST. AVERAGE GLUCOSE BLD GHB EST-MCNC: 280 MG/DL (ref 68–126)
FASTING PATIENT LIPID ANSWER: NO
FASTING STATUS PATIENT QL REPORTED: NO
GLOBULIN PLAS-MCNC: 3.5 G/DL (ref 2–3.5)
GLUCOSE BLD-MCNC: 216 MG/DL (ref 70–99)
HBA1C MFR BLD: 11.4 % (ref ?–5.7)
HCT VFR BLD AUTO: 41.3 % (ref 39–53)
HDLC SERPL-MCNC: 48 MG/DL (ref 40–59)
HGB BLD-MCNC: 14 G/DL (ref 13–17.5)
IMM GRANULOCYTES # BLD AUTO: 0.03 X10(3) UL (ref 0–1)
IMM GRANULOCYTES NFR BLD: 0.3 %
LDLC SERPL CALC-MCNC: 76 MG/DL (ref ?–100)
LYMPHOCYTES # BLD AUTO: 3.03 X10(3) UL (ref 1–4)
LYMPHOCYTES NFR BLD AUTO: 32.9 %
MCH RBC QN AUTO: 30.6 PG (ref 26–34)
MCHC RBC AUTO-ENTMCNC: 33.9 G/DL (ref 31–37)
MCV RBC AUTO: 90.2 FL (ref 80–100)
MONOCYTES # BLD AUTO: 0.94 X10(3) UL (ref 0.1–1)
MONOCYTES NFR BLD AUTO: 10.2 %
NEUTROPHILS # BLD AUTO: 4.84 X10 (3) UL (ref 1.5–7.7)
NEUTROPHILS # BLD AUTO: 4.84 X10(3) UL (ref 1.5–7.7)
NEUTROPHILS NFR BLD AUTO: 52.5 %
NONHDLC SERPL-MCNC: 96 MG/DL (ref ?–130)
OSMOLALITY SERPL CALC.SUM OF ELEC: 289 MOSM/KG (ref 275–295)
PLATELET # BLD AUTO: 416 10(3)UL (ref 150–450)
POTASSIUM SERPL-SCNC: 4.3 MMOL/L (ref 3.5–5.1)
PROT SERPL-MCNC: 8.1 G/DL (ref 5.7–8.2)
RBC # BLD AUTO: 4.58 X10(6)UL (ref 3.8–5.8)
SODIUM SERPL-SCNC: 136 MMOL/L (ref 136–145)
TRIGL SERPL-MCNC: 111 MG/DL (ref 30–149)
VLDLC SERPL CALC-MCNC: 17 MG/DL (ref 0–30)
WBC # BLD AUTO: 9.2 X10(3) UL (ref 4–11)

## 2025-07-23 PROCEDURE — 99214 OFFICE O/P EST MOD 30 MIN: CPT | Performed by: FAMILY MEDICINE

## 2025-07-23 PROCEDURE — G2211 COMPLEX E/M VISIT ADD ON: HCPCS | Performed by: FAMILY MEDICINE

## 2025-07-23 PROCEDURE — 85025 COMPLETE CBC W/AUTO DIFF WBC: CPT | Performed by: FAMILY MEDICINE

## 2025-07-23 PROCEDURE — 80061 LIPID PANEL: CPT | Performed by: FAMILY MEDICINE

## 2025-07-23 PROCEDURE — 82248 BILIRUBIN DIRECT: CPT | Performed by: FAMILY MEDICINE

## 2025-07-23 PROCEDURE — 80053 COMPREHEN METABOLIC PANEL: CPT | Performed by: FAMILY MEDICINE

## 2025-07-23 PROCEDURE — 83036 HEMOGLOBIN GLYCOSYLATED A1C: CPT | Performed by: FAMILY MEDICINE

## 2025-07-23 PROCEDURE — 1111F DSCHRG MED/CURRENT MED MERGE: CPT | Performed by: FAMILY MEDICINE

## 2025-07-23 PROCEDURE — 36415 COLL VENOUS BLD VENIPUNCTURE: CPT | Performed by: FAMILY MEDICINE

## 2025-07-23 RX ORDER — LEVETIRACETAM 500 MG/1
500 TABLET ORAL
COMMUNITY
Start: 2025-07-11

## 2025-07-24 ENCOUNTER — TELEPHONE (OUTPATIENT)
Dept: NEUROSURGERY | Age: 75
End: 2025-07-24

## 2025-07-25 ENCOUNTER — TELEPHONE (OUTPATIENT)
Dept: FAMILY MEDICINE CLINIC | Facility: CLINIC | Age: 75
End: 2025-07-25

## 2025-07-25 ENCOUNTER — PATIENT OUTREACH (OUTPATIENT)
Age: 75
End: 2025-07-25
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
## 2025-07-25 NOTE — TELEPHONE ENCOUNTER
Please assist family in finding placement for this patient within insurance covered benefits:    1st  is a long term memory care unit for Alzhemier's Disease    2nd choice is subacute rehab since he was recently discharged [facility] for brain hemorrhage and subdural hematoma.    3rd choice is home health services for skilled nursing and home PT for gait instability, noncompliance with meds, checking blood sugars, etc    Please call his daughters for any questions.  They said they asked the  at [facility] to work on this at his last hospitalization, and that they called dozens of places, but nothing was available.    Thanks.

## 2025-07-28 ENCOUNTER — PATIENT MESSAGE (OUTPATIENT)
Dept: FAMILY MEDICINE CLINIC | Facility: CLINIC | Age: 75
End: 2025-07-28

## 2025-07-28 DIAGNOSIS — R26.81 GAIT INSTABILITY: Primary | ICD-10-CM

## 2025-07-28 DIAGNOSIS — E78.5 HYPERLIPIDEMIA, UNSPECIFIED HYPERLIPIDEMIA TYPE: ICD-10-CM

## 2025-07-28 DIAGNOSIS — I10 BENIGN ESSENTIAL HYPERTENSION: ICD-10-CM

## 2025-07-28 DIAGNOSIS — S06.6X9S: ICD-10-CM

## 2025-07-28 DIAGNOSIS — F02.80 ALZHEIMER'S DISEASE, UNSPECIFIED (HCC): ICD-10-CM

## 2025-07-28 DIAGNOSIS — G30.9 ALZHEIMER'S DISEASE, UNSPECIFIED (HCC): ICD-10-CM

## 2025-07-28 DIAGNOSIS — I62.00 SUBDURAL HEMORRHAGE (HCC): ICD-10-CM

## 2025-07-28 DIAGNOSIS — E11.65 UNCONTROLLED TYPE 2 DIABETES MELLITUS WITH HYPERGLYCEMIA (HCC): ICD-10-CM

## 2025-07-28 LAB
ABSOLUTE BASOPHILS: 41 CELLS/UL (ref 0–200)
ABSOLUTE EOSINOPHILS: 312 CELLS/UL (ref 15–500)
ABSOLUTE LYMPHOCYTES: 3050 CELLS/UL (ref 850–3900)
ABSOLUTE MONOCYTES: 730 CELLS/UL (ref 200–950)
ABSOLUTE NEUTROPHILS: 4067 CELLS/UL (ref 1500–7800)
ALBUMIN/GLOBULIN RATIO: 1.4 (CALC) (ref 1–2.5)
ALBUMIN: 4.5 G/DL (ref 3.6–5.1)
ALKALINE PHOSPHATASE: 70 U/L (ref 35–144)
ALT: 15 U/L (ref 9–46)
AST: 17 U/L (ref 10–35)
BASOPHILS: 0.5 %
BILIRUBIN, TOTAL: 1.1 MG/DL (ref 0.2–1.2)
BUN: 14 MG/DL (ref 7–25)
CALCIUM: 9.9 MG/DL (ref 8.6–10.3)
CARBON DIOXIDE: 32 MMOL/L (ref 20–32)
CHLORIDE: 100 MMOL/L (ref 98–110)
CHOL/HDLC RATIO: 2.9 (CALC)
CHOLESTEROL, TOTAL: 179 MG/DL
CREATININE, RANDOM URINE: 150 MG/DL (ref 20–320)
CREATININE: 0.97 MG/DL (ref 0.7–1.28)
EGFR: 83 ML/MIN/1.73M2
EOSINOPHILS: 3.8 %
GLOBULIN: 3.3 G/DL (CALC) (ref 1.9–3.7)
GLUCOSE: 215 MG/DL (ref 65–99)
HDL CHOLESTEROL: 61 MG/DL
HEMATOCRIT: 46.7 % (ref 38.5–50)
HEMOGLOBIN A1C: 10.7 % OF TOTAL HGB
HEMOGLOBIN: 15.3 G/DL (ref 13.2–17.1)
LDL-CHOLESTEROL: 93 MG/DL (CALC)
LYMPHOCYTES: 37.2 %
MCH: 30.8 PG (ref 27–33)
MCHC: 32.8 G/DL (ref 32–36)
MCV: 94.2 FL (ref 80–100)
MICROALBUMIN/CREATININE RATIO, RANDOM URINE: 91 MG/G CREAT
MICROALBUMIN: 13.7 MG/DL
MONOCYTES: 8.9 %
MPV: 10.2 FL (ref 7.5–12.5)
NEUTROPHILS: 49.6 %
NON-HDL CHOLESTEROL: 118 MG/DL (CALC)
PLATELET COUNT: 372 THOUSAND/UL (ref 140–400)
POTASSIUM: 4.9 MMOL/L (ref 3.5–5.3)
PROTEIN, TOTAL: 7.8 G/DL (ref 6.1–8.1)
RDW: 11.8 % (ref 11–15)
RED BLOOD CELL COUNT: 4.96 MILLION/UL (ref 4.2–5.8)
SODIUM: 139 MMOL/L (ref 135–146)
TRIGLYCERIDES: 149 MG/DL
WHITE BLOOD CELL COUNT: 8.2 THOUSAND/UL (ref 3.8–10.8)

## 2025-07-29 ENCOUNTER — PATIENT OUTREACH (OUTPATIENT)
Age: 75
End: 2025-07-29

## 2025-07-31 ENCOUNTER — PATIENT OUTREACH (OUTPATIENT)
Age: 75
End: 2025-07-31

## 2025-08-01 ENCOUNTER — PATIENT OUTREACH (OUTPATIENT)
Age: 75
End: 2025-08-01

## 2025-08-11 ENCOUNTER — TELEPHONE (OUTPATIENT)
Dept: FAMILY MEDICINE CLINIC | Facility: CLINIC | Age: 75
End: 2025-08-11

## 2025-08-11 DIAGNOSIS — I10 BENIGN ESSENTIAL HYPERTENSION: Primary | ICD-10-CM

## 2025-08-11 DIAGNOSIS — E11.65 UNCONTROLLED TYPE 2 DIABETES MELLITUS WITH HYPERGLYCEMIA (HCC): ICD-10-CM

## 2025-08-11 RX ORDER — BLOOD-GLUCOSE METER
1 EACH MISCELLANEOUS DAILY
Qty: 1 KIT | Refills: 0 | Status: SHIPPED | OUTPATIENT
Start: 2025-08-11 | End: 2026-08-11

## 2025-08-11 RX ORDER — BLOOD SUGAR DIAGNOSTIC
STRIP MISCELLANEOUS
Qty: 100 EACH | Refills: 3 | Status: SHIPPED | OUTPATIENT
Start: 2025-08-11

## 2025-08-12 ENCOUNTER — PATIENT OUTREACH (OUTPATIENT)
Age: 75
End: 2025-08-12

## 2025-08-19 ENCOUNTER — PATIENT OUTREACH (OUTPATIENT)
Dept: CASE MANAGEMENT | Age: 75
End: 2025-08-19

## 2025-08-20 ENCOUNTER — TELEPHONE (OUTPATIENT)
Dept: FAMILY MEDICINE CLINIC | Facility: CLINIC | Age: 75
End: 2025-08-20

## 2025-08-20 DIAGNOSIS — E11.65 UNCONTROLLED TYPE 2 DIABETES MELLITUS WITH HYPERGLYCEMIA (HCC): Primary | ICD-10-CM

## 2025-08-21 RX ORDER — CALCIUM CITRATE/VITAMIN D3 200MG-6.25
1 TABLET ORAL DAILY
Qty: 100 STRIP | Refills: 11 | Status: SHIPPED | OUTPATIENT
Start: 2025-08-21

## (undated) DIAGNOSIS — F03.91 DEMENTIA WITH BEHAVIORAL DISTURBANCE, UNSPECIFIED DEMENTIA TYPE (HCC): ICD-10-CM

## (undated) DIAGNOSIS — E11.65 UNCONTROLLED TYPE 2 DIABETES MELLITUS WITH HYPERGLYCEMIA (HCC): Primary | ICD-10-CM

## (undated) DIAGNOSIS — R41.3 MEMORY DEFICIT: Primary | ICD-10-CM

## (undated) NOTE — LETTER
4/13/2022              [address] [place] [id number]         Dear Melinda Barber,    This letter is to inform you that our office has made several attempts to reach you by phone without success. We were attempting to contact you by phone regarding radiology report regarding the Left Hand. Please contact our office at the number listed below as soon as you receive this letter to discuss this issue and to make the necessary changes in our system to your contact information. Thank you for your cooperation.         Sincerely,    Rubi Sanchez MD  [address] [place] [address], [address] [place] [address], [address] [place], [place] [address]fred Vázquez  [phone]

## (undated) NOTE — MR AVS SNAPSHOT
After Visit Summary   2/16/2022    Rahul Merritt   MRN: [id number]           Visit Information     Date & Time  2/16/2022  1:30 PM Provider  Devika Gasca MD Department  [address] [place] [address] Dept. Phone  [phone]      Allergies as of 2/16/2022  Review status set to Review Complete on 1/5/2022   No Known Allergies     Your Current Medications        Dosage    Blood Glucose Monitoring Suppl (ONETOUCH ULTRA 2) w/Device Does not apply Kit 1 Device by Other route one time for 1 dose. Use as directed. Glucose Blood (ONETOUCH ULTRA) In Vitro Strip Check blood sugar once daily    OneTouch Delica Lancets 16I Does not apply Misc 1 each one time for 1 dose. Check blood sugar once daily Dx Code: E11.9 non-insulin dependent    glimepiride 4 MG Oral Tab Take 1 tablet (4 mg total) by mouth daily with breakfast.    donepezil 5 MG Oral Tab Take 1 tablet (5 mg total) by mouth nightly. amLODIPine Besy-Benazepril HCl 10-40 MG Oral Cap Take 1 capsule by mouth daily. metFORMIN HCl 1000 MG Oral Tab Take 1 tablet (1,000 mg total) by mouth 2 (two) times daily with meals. Thiamine HCl (VITAMIN B-1 OR) Take by mouth. hydrochlorothiazide 25 MG Oral Tab     atorvastatin 10 MG Oral Tab Take 10 mg by mouth nightly. aspirin 81 MG Oral Tab Take 81 mg by mouth daily. Blood Glucose Monitoring Suppl (ONETOUCH ULTRA 2) W/DEVICE Does not apply Kit Use as directed    ONETOUCH DELICA LANCETS 65W Does not apply Misc 200 Devices by Does not apply route 2 (two) times daily.       Diagnoses for This Visit    Tingling of both feet   [7688657]  -  Primary  Paresthesia of both feet   [[id number]]    Sensory motor neuropathy   [[id number]]             We Ordered the Following     Normal Orders This Visit    EMG [NEU5 CUSTOM]       Future Appointments        Provider Department    3/7/2022 11:40 AM Bere Swain                Did you know that [facility] primary care physicians now offer Video Visits through [address] for adult patients for a variety of conditions such as allergies, back pain and cold symptoms? Skip the drive and waiting room and online chat with a doctor face-to-face using your web-cam enabled computer or mobile device wherever you are. Video Visits cost $50 and can be paid hassle-free using a credit, debit, or health savings card. Not active on [employer]? Ask us how to get signed up today! If you receive a survey from [employer], please take a few minutes to complete it and provide feedback. We strive to deliver the best patient experience and are looking for ways to make improvements. Your feedback will help us do so. For more information on [employer], please visit [email or web address]           No text in SmartText           No text in SmartText

## (undated) NOTE — LETTER
12/29/17                [facility] [address] [address] [id number]      Dear Slade Sawant,     We are contacting you from [facility] office. Your health is important to us.  We have not received test results for the blood work that your provider rec

## (undated) NOTE — LETTER
2/1/18        [facility] [address] [id number]      Dear Laurence Jerry,     We are contacting you from Dr. Vick Beard office. Your health is important to us.  We have not received test results for additional tests that your provider recomme

## (undated) NOTE — LETTER
04/13/23        [facility] [id number]      Dear Thu Nicholson,    Our records indicate that you have outstanding lab work and or testing that was ordered for you and has not yet been completed:  Orders Placed This Encounter      PSA - Total [[id number]][Q]      INSURE FECAL GLOBIN by IMMUNOASSAY [[id number]] [Q]  To provide you with the best possible care, please complete these orders at your earliest convenience. If you have recently completed these orders please disregard this letter. If you have any questions please call the office at Dept: [phone].      Thank you,       Sunny Ordaz MD

## (undated) NOTE — LETTER
04/13/23        [address] [phone]      Dear Bora Jackson,    Our records indicate that you have outstanding lab work and or testing that was ordered for you and has not yet been completed:  Orders Placed This Encounter      PSA - Total [[id number]][Q]      INSURE FECAL GLOBIN by IMMUNOASSAY [[id number]] [Q]  To provide you with the best possible care, please complete these orders at your earliest convenience. If you have recently completed these orders please disregard this letter. If you have any questions please call the office at Dept: [phone].      Thank you,       Carolyn Rojas MD